# Patient Record
Sex: FEMALE | Race: WHITE | Employment: OTHER | URBAN - METROPOLITAN AREA
[De-identification: names, ages, dates, MRNs, and addresses within clinical notes are randomized per-mention and may not be internally consistent; named-entity substitution may affect disease eponyms.]

---

## 2017-07-07 ENCOUNTER — OFFICE VISIT (OUTPATIENT)
Dept: NEUROLOGY | Age: 62
End: 2017-07-07

## 2017-07-07 VITALS
DIASTOLIC BLOOD PRESSURE: 74 MMHG | RESPIRATION RATE: 16 BRPM | OXYGEN SATURATION: 98 % | WEIGHT: 138 LBS | HEART RATE: 65 BPM | SYSTOLIC BLOOD PRESSURE: 122 MMHG | BODY MASS INDEX: 20.92 KG/M2 | HEIGHT: 68 IN

## 2017-07-07 DIAGNOSIS — G43.109 MIGRAINE WITH AURA AND WITHOUT STATUS MIGRAINOSUS, NOT INTRACTABLE: Primary | ICD-10-CM

## 2017-07-07 RX ORDER — ZOLMITRIPTAN 5 MG/1
SPRAY NASAL
Qty: 12 CONTAINER | Refills: 11 | Status: SHIPPED | OUTPATIENT
Start: 2017-07-07 | End: 2018-04-03 | Stop reason: SDUPTHER

## 2017-07-07 RX ORDER — SERTRALINE HYDROCHLORIDE 100 MG/1
100 TABLET, FILM COATED ORAL DAILY
Qty: 90 TAB | Refills: 5 | Status: SHIPPED | OUTPATIENT
Start: 2017-07-07 | End: 2018-07-10 | Stop reason: SDUPTHER

## 2017-07-07 RX ORDER — TOPIRAMATE 100 MG/1
100 TABLET, FILM COATED ORAL 2 TIMES DAILY
Qty: 180 TAB | Refills: 5 | Status: SHIPPED | OUTPATIENT
Start: 2017-07-07 | End: 2018-07-10 | Stop reason: SDUPTHER

## 2017-07-07 NOTE — MR AVS SNAPSHOT
Visit Information Date & Time Provider Department Dept. Phone Encounter #  
 7/7/2017 10:40 AM Corina Crocker MD Neurology Clinic at [facility] [phone] [id number] Follow-up Instructions Return in about 6 months (around 1/7/2018). Your Appointments 7/6/2018 10:40 AM  
Follow Up with Corina Crocker MD  
Neurology Clinic at [facility] Appt Note: f/u migraines, jrb 7/7/17  
 [address] [place] [address], 
[address] [facility], [address] [address]  
[address] [facility], [address] [facility], [address] [address] Upcoming Health Maintenance Date Due Hepatitis C Screening 1955 DTaP/Tdap/Td series (1 - Tdap) 4/28/1974 PAP AKA CERVICAL CYTOLOGY 4/28/1974 BREAST CANCER SCRN MAMMOGRAM 4/28/2003 FOBT Q 1 YEAR AGE 50-75 4/28/2003 ZOSTER VACCINE AGE 60> 4/28/2013 INFLUENZA AGE 9 TO ADULT 8/1/2017 Allergies as of 7/7/2017  Review Complete On: 7/7/2017 By: Corina Crocker MD  
  
 Severity Noted Reaction Type Reactions Reglan [Metoclopramide] High 07/15/2013    Anaphylaxis Bactrim [Sulfamethoprim Ds]  07/15/2013    Rash Pcn [Penicillins]  07/15/2013    Other (comments) Current Immunizations  Never Reviewed No immunizations on file. Not reviewed this visit You Were Diagnosed With   
  
 Codes Comments Migraine with aura and without status migrainosus, not intractable    -  Primary ICD-10-CM: G43.109 ICD-9-CM: 346.00 Vitals BP Pulse Resp Height(growth percentile) Weight(growth percentile) SpO2  
 122/74 65 16 5' 8\" (1.727 m) 138 lb (62.6 kg) 98% BMI OB Status Smoking Status 20.98 kg/m2 Menopause Never Smoker Vitals History BMI and BSA Data Body Mass Index Body Surface Area  
 20.98 kg/m 2 1.73 m 2 Preferred Pharmacy Pharmacy Name Phone [facility]/PHARMACY #[id number]- Radha Chambers, [address] [phone] Your Updated Medication List  
  
   
This list is accurate as of: 7/7/17 11:30 AM.  Always use your most recent med list.  
  
  
  
  
 aspirin 81 mg tablet Take 81 mg by mouth. CENTRUM SILVER Tab tablet Generic drug:  multivitamins-minerals-lutein Take  by mouth. CO Q-10 100 mg capsule Generic drug:  co-enzyme Q-10 Take 300 mg by mouth daily. ibuprofen 200 mg tablet Commonly known as:  MOTRIN Take  by mouth.  
  
 magnesium oxide 400 mg tablet Commonly known as:  MAG-OX Take 400 mg by mouth daily. MULTIVITAMIN PO Take  by mouth. Women's vitafusion  
  
 promethazine 25 mg suppository Commonly known as:  PHENERGAN Insert 1 Suppository into rectum every six (6) hours as needed for Nausea. sertraline 100 mg tablet Commonly known as:  ZOLOFT Take 1 Tab by mouth daily. topiramate 100 mg tablet Commonly known as:  TOPAMAX Take 1 Tab by mouth two (2) times a day. triamcinolone 55 mcg nasal inhaler Commonly known as:  NASACORT AQ  
2 Sprays as needed. VITAMIN D3 1,000 unit Cap Generic drug:  cholecalciferol Take  by mouth. XANAX PO Take  by mouth as needed. ZOLMitriptan 5 mg nasal solution Commonly known as:  ZOMIG  
USE 1 SPRAY IN EACH NOSTRIL EVERY 12 HOURS AS NEEDED FOR HEADACHE. MUST LAST 30 DAYS Prescriptions Sent to Pharmacy Refills  
 topiramate (TOPAMAX) 100 mg tablet 5 Sig: Take 1 Tab by mouth two (2) times a day. Class: Normal  
 Pharmacy: [facility] #: [phone] Route: Oral  
 sertraline (ZOLOFT) 100 mg tablet 5 Sig: Take 1 Tab by mouth daily.   
 Class: Normal  
 Pharmacy: [facility]/pharmacy #[id number] - [facility] [address]  #: [phone] Route: Oral  
 ZOLMitriptan (ZOMIG) 5 mg nasal solution 11 Sig: USE 1 SPRAY IN EACH NOSTRIL EVERY 12 HOURS AS NEEDED FOR HEADACHE. MUST LAST 30 DAYS Class: Normal  
 Pharmacy: [facility] #: [phone] We Performed the Following TOPIRAMATE [21979 CPT(R)] Follow-up Instructions Return in about 6 months (around 1/7/2018). Patient Instructions A Healthy Lifestyle: Care Instructions Your Care Instructions A healthy lifestyle can help you feel good, stay at a healthy weight, and have plenty of energy for both work and play. A healthy lifestyle is something you can share with your whole family. A healthy lifestyle also can lower your risk for serious health problems, such as high blood pressure, heart disease, and diabetes. You can follow a few steps listed below to improve your health and the health of your family. Follow-up care is a key part of your treatment and safety. Be sure to make and go to all appointments, and call your doctor if you are having problems. Its also a good idea to know your test results and keep a list of the medicines you take. How can you care for yourself at home? · Do not eat too much sugar, fat, or fast foods. You can still have dessert and treats now and then. The goal is moderation. · Start small to improve your eating habits. Pay attention to portion sizes, drink less juice and soda pop, and eat more fruits and vegetables. ¨ Eat a healthy amount of food. A 3-ounce serving of meat, for example, is about the size of a deck of cards. Fill the rest of your plate with vegetables and whole grains. ¨ Limit the amount of soda and sports drinks you have every day. Drink more water when you are thirsty. ¨ Eat at least 5 servings of fruits and vegetables every day.  It may seem like a lot, but it is not hard to reach this goal. A serving or helping is 1 piece of fruit, 1 cup of vegetables, or 2 cups of leafy, raw vegetables. Have an apple or some carrot sticks as an afternoon snack instead of a candy bar. Try to have fruits and/or vegetables at every meal. 
· Make exercise part of your daily routine. You may want to start with simple activities, such as walking, bicycling, or slow swimming. Try to be active 30 to 60 minutes every day. You do not need to do all 30 to 60 minutes all at once. For example, you can exercise 3 times a day for 10 or 20 minutes. Moderate exercise is safe for most people, but it is always a good idea to talk to your doctor before starting an exercise program. 
· Keep moving. Trudy Adrianne the lawn, work in the garden, or [employer]. Take the stairs instead of the elevator at work. · If you smoke, quit. People who smoke have an increased risk for heart attack, stroke, cancer, and other lung illnesses. Quitting is hard, but there are ways to boost your chance of quitting tobacco for good. ¨ Use nicotine gum, patches, or lozenges. ¨ Ask your doctor about stop-smoking programs and medicines. ¨ Keep trying. In addition to reducing your risk of diseases in the future, you will notice some benefits soon after you stop using tobacco. If you have shortness of breath or asthma symptoms, they will likely get better within a few weeks after you quit. · Limit how much alcohol you drink. Moderate amounts of alcohol (up to 2 drinks a day for men, 1 drink a day for women) are okay. But drinking too much can lead to liver problems, high blood pressure, and other health problems. Family health If you have a family, there are many things you can do together to improve your health. · Eat meals together as a family as often as possible. · Eat healthy foods. This includes fruits, vegetables, lean meats and dairy, and whole grains. · Include your family in your fitness plan. Most people think of activities such as jogging or tennis as the way to fitness, but there are many ways you and your family can be more active. Anything that makes you breathe hard and gets your heart pumping is exercise. Here are some tips: 
¨ Walk to do errands or to take your child to school or the bus. ¨ Go for a family bike ride after dinner instead of watching TV. Where can you learn more? Go to http://manuela-yesica.info/. Enter A406 in the search box to learn more about \"A Healthy Lifestyle: Care Instructions. \" Current as of: July 26, 2016 Content Version: 11.3 © 3141-7651 [employer]. Care instructions adapted under license by [employer] (which disclaims liability or warranty for this information). If you have questions about a medical condition or this instruction, always ask your healthcare professional. [address] any warranty or liability for your use of this information. Introducing [facility]! Annie Mcelroy introduces Terarecon patient portal. Now you can access parts of your medical record, email your doctor's office, and request medication refills online. 1. In your internet browser, go to [email or web address] 2. Click on the First Time User? Click Here link in the Sign In box. You will see the New Member Sign Up page. 3. Enter your Terarecon Access Code exactly as it appears below. You will not need to use this code after youve completed the sign-up process. If you do not sign up before the expiration date, you must request a new code. · Terarecon Access Code: [email or web address] Expires: 7/17/2017 11:08 AM 
 
4. Enter the last four digits of your Social Security Number (xxxx) and Date of Birth (mm/dd/yyyy) as indicated and click Submit. You will be taken to the next sign-up page. 5. Create a SkyCache ID. This will be your SkyCache login ID and cannot be changed, so think of one that is secure and easy to remember. 6. Create a SkyCache password. You can change your password at any time. 7. Enter your Password Reset Question and Answer. This can be used at a later time if you forget your password. 8. Enter your e-mail address. You will receive e-mail notification when new information is available in [address] 9. Click Sign Up. You can now view and download portions of your medical record. 10. Click the Download Summary menu link to download a portable copy of your medical information. If you have questions, please visit the Frequently Asked Questions section of the SkyCache website. Remember, SkyCache is NOT to be used for urgent needs. For medical emergencies, dial 911. Now available from your iPhone and Android! Please provide this summary of care documentation to your next provider. Your primary care clinician is listed as Britney Dong. If you have any questions after today's visit, please call [phone].

## 2017-07-07 NOTE — LETTER
7/7/2017 10:43 PM 
 
Patient:  Cindi Cano YOB: 1955 Date of Visit: 7/7/2017 Dear No Recipients: Thank you for referring Ms. Willian Marion to me for evaluation/treatment. Below are the relevant portions of my assessment and plan of care. Consult Subjective:  
 
Cindi Cano is a 57 y. o.right-handed  female seen for evaluation of new problem over the last several months of increasing headaches, mainly in the frontal and sinus area associated with marked allergies and sinus congestion that occurred about 3-4 months ago, causing her headaches several times a week that were difficult to control even with antihistamines and medications for her sinus flareups at the request of Dr. Caleb Johnson. Her allergies have gotten somewhat better over the last few weeks, and the headaches are better and back to her normal baseline now. Her allergist retired and she does not know who to go to I suggested she see another physician in their group. She has tension vascular headaches and is currently on Topamax 100 mg twice a day and takes Zomig nasal spray as needed for headaches. We gave her refills for 12 nasal sprays but she only gets 6 a month, and I told her she should get 12 make sure the insurance company does give her that. She had no new focal weakness, sensory loss, fever, meningismus, or other causes of her headaches. Her  came with her and he is very concerned about her. She denies any increased stress tension or anxiety in addition. She is retired now and should be more relaxed. She does not get the aura anymore I told her that is exactly the opposite of what should happen, at her age she should be getting less headaches and more aura. Patient last seen one year ago.  Patient has a long-standing history of headaches, and has occasional exacerbation that requires Medrol Dosepak to break her headache cycle. She normally takes Zomig nasal spray good relief and takes Topamax 100 mg BID as prophylactic therapy, and aspirin 81 mg each day, magnesium oxide and coenzyme Q 10. She is also taking Zoloft 100 mg a day which helps control her headaches stress and tension. She's had normal imaging of the brain in the past, normal metabolic studies ruling out arteritis or other connective tissue disease. She has had no new focal weakness sensory loss visual changes cognitive issues gait problems or other new focal neurologic problems. Her headaches are more bitemporal, throbbing in nature associated with nausea and vomiting and occur once or twice a month, and occasionally more often. Stress and tension are precipitating factors. 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 A complete review of systems in symptoms was negative for any new medical problems complications and illnesses other than mild anxiety, depression, skin cancer, and respiratory infections. Past Medical History:  
Diagnosis Date  Headache History reviewed. No pertinent surgical history. Family History Problem Relation Age of Onset  Dementia Mother  Diabetes Mother  Heart Disease Father  Other Father   
  pad  Diabetes Father  Diabetes Brother Social History Substance Use Topics  Smoking status: Never Smoker  Smokeless tobacco: Never Used  Alcohol use No  
   
Current Outpatient Prescriptions Medication Sig Dispense Refill  MULTIVITAMIN PO Take  by mouth. Women's vitafusion  topiramate (TOPAMAX) 100 mg tablet Take 1 Tab by mouth two (2) times a day. 180 Tab 5  sertraline (ZOLOFT) 100 mg tablet Take 1 Tab by mouth daily. 90 Tab 5  ZOLMitriptan (ZOMIG) 5 mg nasal solution USE 1 SPRAY IN EACH NOSTRIL EVERY 12 HOURS AS NEEDED FOR HEADACHE.  MUST LAST 30 DAYS 12 Container 11  
  triamcinolone (NASACORT AQ) 55 mcg nasal inhaler 2 Sprays as needed.  magnesium oxide (MAG-OX) 400 mg tablet Take 400 mg by mouth daily.  co-enzyme Q-10 (CO Q-10) 100 mg capsule Take 300 mg by mouth daily.  promethazine (PHENERGAN) 25 mg suppository Insert 1 Suppository into rectum every six (6) hours as needed for Nausea. 12 Suppository 2  ibuprofen (MOTRIN) 200 mg tablet Take  by mouth.  aspirin 81 mg tablet Take 81 mg by mouth.  ALPRAZOLAM (XANAX PO) Take  by mouth as needed.  Cholecalciferol, Vitamin D3, (VITAMIN D3) 1,000 unit cap Take  by mouth.  multivitamins-minerals-lutein (CENTRUM SILVER) Tab Take  by mouth. Allergies Allergen Reactions  Reglan [Metoclopramide] Anaphylaxis  Bactrim [Sulfamethoprim Ds] Rash  Pcn [Penicillins] Other (comments) Review of Systems: A comprehensive review of systems was negative except for: Neurological: positive for headaches Objective: I 
 
 
NEUROLOGICAL EXAM: 
 
Appearance: The patient is thinly developed and nourished, provides a coherent history and is in no acute distress. Mental Status: Oriented to time, place and person and the president. Speech is fluent without aphasia, and cognitive function and fund of knowledge normal for age. Mood and affect mildly depressed. Cranial Nerves:   Intact visual fields. Fundi are benign. MAGI, EOM's full, no nystagmus, no ptosis. Facial sensation is normal. Corneal reflexes are not tested. Facial movement is symmetric. Hearing is normal bilaterally. Palate is midline with normal sternocleidomastoid and trapezius muscles are normal. Tongue is midline. Neck is supple without meningismus or bruits Temporal arteries not tender or enlarged Motor:  5/5 strength in upper and lower proximal and distal muscles. Normal bulk and tone. No fasciculations. Reflexes:   Deep tendon reflexes 2+/4 and symmetrical. No Babinski or clonus present Sensory:   Normal to touch, pinprick and vibration and temperature and DSS. Gait:  Normal gait. Tremor:   No tremor noted. Cerebellar:  No cerebellar signs present. Neurovascular:  Normal heart sounds and regular rhythm, peripheral pulses decreased in both feet and no carotid bruits. Assessment:  
 
 
 
Plan:  
 
Patient with recent breakthrough and marked increase in headaches, probably related to her marked allergies and sinus problems Patient seems back to normal now the last few weeks but had several months of severe headache associated with severe allergies Recommended she see her allergy group and see another physician since her allergist has retired. We will check a Topamax level to see if there is any room to increase her medication should she have another flareup Her medications are refilled for the patient today, her condition discussed with the patient and her  in detail Patient is to continue present medications. Patient was advised to remain mentally and physically active, exercising, take her vitamins and vitamin D and medications as prescribed. Refills given today for her medications Patient to be seen again in 6 months time or earlier if needed, and patient will call if any problems in the interim. Signed By: Barry Mendes MD   
 July 7, 2017 This note will not be viewable in [address] If you have questions, please do not hesitate to call me. I look forward to following Ms. Marion along with you. Sincerely, Barry Mendes MD

## 2017-07-07 NOTE — PATIENT INSTRUCTIONS

## 2017-07-08 NOTE — PROGRESS NOTES
Consult    Subjective:     Karthik Rascon is a 57 y. o.right-handed  female seen for evaluation of new problem over the last several months of increasing headaches, mainly in the frontal and sinus area associated with marked allergies and sinus congestion that occurred about 3-4 months ago, causing her headaches several times a week that were difficult to control even with antihistamines and medications for her sinus flareups at the request of Dr. Delmi Chen. Her allergies have gotten somewhat better over the last few weeks, and the headaches are better and back to her normal baseline now. Her allergist retired and she does not know who to go to I suggested she see another physician in their group. She has tension vascular headaches and is currently on Topamax 100 mg twice a day and takes Zomig nasal spray as needed for headaches. We gave her refills for 12 nasal sprays but she only gets 6 a month, and I told her she should get 12 make sure the insurance company does give her that. She had no new focal weakness, sensory loss, fever, meningismus, or other causes of her headaches. Her  came with her and he is very concerned about her. She denies any increased stress tension or anxiety in addition. She is retired now and should be more relaxed. She does not get the aura anymore I told her that is exactly the opposite of what should happen, at her age she should be getting less headaches and more aura. Patient last seen one year ago. Patient has a long-standing history of headaches, and has occasional exacerbation that requires Medrol Dosepak to break her headache cycle. She normally takes Zomig nasal spray good relief and takes Topamax 100 mg BID as prophylactic therapy, and aspirin 81 mg each day, magnesium oxide and coenzyme Q 10. She is also taking Zoloft 100 mg a day which helps control her headaches stress and tension.  She's had normal imaging of the brain in the past, normal metabolic studies ruling out arteritis or other connective tissue disease. She has had no new focal weakness sensory loss visual changes cognitive issues gait problems or other new focal neurologic problems. Her headaches are more bitemporal, throbbing in nature associated with nausea and vomiting and occur once or twice a month, and occasionally more often. Stress and tension are precipitating factors. 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 A complete review of systems in symptoms was negative for any new medical problems complications and illnesses other than mild anxiety, depression, skin cancer, and respiratory infections. Past Medical History:   Diagnosis Date    Headache       History reviewed. No pertinent surgical history. Family History   Problem Relation Age of Onset    Dementia Mother     Diabetes Mother     Heart Disease Father     Other Father      pad    Diabetes Father     Diabetes Brother       Social History   Substance Use Topics    Smoking status: Never Smoker    Smokeless tobacco: Never Used    Alcohol use No       Current Outpatient Prescriptions   Medication Sig Dispense Refill    MULTIVITAMIN PO Take  by mouth. Women's vitafusion      topiramate (TOPAMAX) 100 mg tablet Take 1 Tab by mouth two (2) times a day. 180 Tab 5    sertraline (ZOLOFT) 100 mg tablet Take 1 Tab by mouth daily. 90 Tab 5    ZOLMitriptan (ZOMIG) 5 mg nasal solution USE 1 SPRAY IN EACH NOSTRIL EVERY 12 HOURS AS NEEDED FOR HEADACHE. MUST LAST 30 DAYS 12 Container 11    triamcinolone (NASACORT AQ) 55 mcg nasal inhaler 2 Sprays as needed.  magnesium oxide (MAG-OX) 400 mg tablet Take 400 mg by mouth daily.  co-enzyme Q-10 (CO Q-10) 100 mg capsule Take 300 mg by mouth daily.  promethazine (PHENERGAN) 25 mg suppository Insert 1 Suppository into rectum every six (6) hours as needed for Nausea.  12 Suppository 2    ibuprofen (MOTRIN) 200 mg tablet Take by mouth.  aspirin 81 mg tablet Take 81 mg by mouth.  ALPRAZOLAM (XANAX PO) Take  by mouth as needed.  Cholecalciferol, Vitamin D3, (VITAMIN D3) 1,000 unit cap Take  by mouth.  multivitamins-minerals-lutein (CENTRUM SILVER) Tab Take  by mouth. Allergies   Allergen Reactions    Reglan [Metoclopramide] Anaphylaxis    Bactrim [Sulfamethoprim Ds] Rash    Pcn [Penicillins] Other (comments)        Review of Systems:  A comprehensive review of systems was negative except for: Neurological: positive for headaches     Objective:     I      NEUROLOGICAL EXAM:    Appearance: The patient is thinly developed and nourished, provides a coherent history and is in no acute distress. Mental Status: Oriented to time, place and person and the president. Speech is fluent without aphasia, and cognitive function and fund of knowledge normal for age. Mood and affect mildly depressed. Cranial Nerves:   Intact visual fields. Fundi are benign. MAGI, EOM's full, no nystagmus, no ptosis. Facial sensation is normal. Corneal reflexes are not tested. Facial movement is symmetric. Hearing is normal bilaterally. Palate is midline with normal sternocleidomastoid and trapezius muscles are normal. Tongue is midline. Neck is supple without meningismus or bruits  Temporal arteries not tender or enlarged    Motor:  5/5 strength in upper and lower proximal and distal muscles. Normal bulk and tone. No fasciculations. Reflexes:   Deep tendon reflexes 2+/4 and symmetrical. No Babinski or clonus present   Sensory:   Normal to touch, pinprick and vibration and temperature and DSS. Gait:  Normal gait. Tremor:   No tremor noted. Cerebellar:  No cerebellar signs present. Neurovascular:  Normal heart sounds and regular rhythm, peripheral pulses decreased in both feet and no carotid bruits.            Assessment:         Plan:     Patient with recent breakthrough and marked increase in headaches, probably related to her marked allergies and sinus problems  Patient seems back to normal now the last few weeks but had several months of severe headache associated with severe allergies  Recommended she see her allergy group and see another physician since her allergist has retired. We will check a Topamax level to see if there is any room to increase her medication should she have another flareup  Her medications are refilled for the patient today, her condition discussed with the patient and her  in detail  Patient is to continue present medications. Patient was advised to remain mentally and physically active, exercising, take her vitamins and vitamin D and medications as prescribed. Refills given today for her medications  Patient to be seen again in 6 months time or earlier if needed, and patient will call if any problems in the interim. Signed By: Vinny Varela MD     July 7, 2017       This note will not be viewable in [address]

## 2017-07-17 LAB — TOPIRAMATE SERPL-MCNC: 1.5 UG/ML (ref 2–25)

## 2017-07-25 DIAGNOSIS — G43.109 MIGRAINE WITH AURA AND WITHOUT STATUS MIGRAINOSUS, NOT INTRACTABLE: ICD-10-CM

## 2017-07-25 RX ORDER — ZOLMITRIPTAN 5 MG/1
SPRAY, METERED NASAL
Refills: 10 | OUTPATIENT
Start: 2017-07-25

## 2018-01-19 ENCOUNTER — OFFICE VISIT (OUTPATIENT)
Dept: NEUROLOGY | Age: 63
End: 2018-01-19

## 2018-01-19 VITALS
HEART RATE: 73 BPM | SYSTOLIC BLOOD PRESSURE: 120 MMHG | WEIGHT: 137 LBS | BODY MASS INDEX: 20.76 KG/M2 | OXYGEN SATURATION: 98 % | HEIGHT: 68 IN | DIASTOLIC BLOOD PRESSURE: 70 MMHG

## 2018-01-19 DIAGNOSIS — G43.109 MIGRAINE WITH AURA AND WITHOUT STATUS MIGRAINOSUS, NOT INTRACTABLE: Primary | ICD-10-CM

## 2018-01-19 NOTE — PROGRESS NOTES
Date:             2018    Name:  Ivan Martin  :  1955  MRN:  [id number]     PCP:  Addy Gonzalez MD    Chief Complaint   Patient presents with    Follow-up    Migraine         HISTORY OF PRESENT ILLNESS:  Xavier Reynolds is a 57 y.o., female who presents today for follow up for headaches. Headaches are about the same since she was last seen. Weather changes trigger her headaches, so she has had more headaches lately the temperature fluctuations. Her allergies are worse, used to see an allergist for shots. She is allergic to mold and dust mites. She has an appointment with a new allergist to be tested again to see if allergies are triggering her headaches. She just complete a medrol dose pack because she was going through her zomig very quickly with recent weather changes, but she is also very congested and thinks that this may be part of her headache process. She stopped taking her decongestants and her Zomig, took the steroids and her headache improved. She is on topamax 100 mg big, sertraline 100 mg daily, magnesium and Coq10 for preventative. This has greatly reduced her headache frequency and intensity, she used to go to the ER frequently for migraines. She no longer gets nausea with her migraines, and usually the Zomig works within 5-7 minutes. She usually has about two episodes of bad migraine cycles per year that require steroid dose pack. In December, she thinks that she had maybe 7-10 migraine days. She is very happy with her Zomig because it works quickly and does not upset her stomach. 2017 recap  Xavier Reynolds is a 57 y. o.right-handed  female seen for evaluation of new problem over the last several months of increasing headaches, mainly in the frontal and sinus area associated with marked allergies and sinus congestion that occurred about 3-4 months ago, causing her headaches several times a week that were difficult to control even with antihistamines and medications for her sinus flareups at the request of Dr. Tang Saab. Her allergies have gotten somewhat better over the last few weeks, and the headaches are better and back to her normal baseline now. Her allergist retired and she does not know who to go to I suggested she see another physician in their group. She has tension vascular headaches and is currently on Topamax 100 mg twice a day and takes Zomig nasal spray as needed for headaches. We gave her refills for 12 nasal sprays but she only gets 6 a month, and I told her she should get 12 make sure the insurance company does give her that. She had no new focal weakness, sensory loss, fever, meningismus, or other causes of her headaches. Her  came with her and he is very concerned about her. She denies any increased stress tension or anxiety in addition. She is retired now and should be more relaxed. She does not get the aura anymore I told her that is exactly the opposite of what should happen, at her age she should be getting less headaches and more aura. Patient last seen one year ago. Patient has a long-standing history of headaches, and has occasional exacerbation that requires Medrol Dosepak to break her headache cycle. She normally takes Zomig nasal spray good relief and takes Topamax 100 mg BID as prophylactic therapy, and aspirin 81 mg each day, magnesium oxide and coenzyme Q 10. She is also taking Zoloft 100 mg a day which helps control her headaches stress and tension. She's had normal imaging of the brain in the past, normal metabolic studies ruling out arteritis or other connective tissue disease. She has had no new focal weakness sensory loss visual changes cognitive issues gait problems or other new focal neurologic problems. Her headaches are more bitemporal, throbbing in nature associated with nausea and vomiting and occur once or twice a month, and occasionally more often. Stress and tension are precipitating factors.  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     A complete review of systems in symptoms was negative for any new medical problems complications and illnesses other than mild anxiety, depression, skin cancer, and respiratory infections. Current Outpatient Prescriptions   Medication Sig    MULTIVITAMIN PO Take  by mouth. Women's vitafusion    topiramate (TOPAMAX) 100 mg tablet Take 1 Tab by mouth two (2) times a day.  sertraline (ZOLOFT) 100 mg tablet Take 1 Tab by mouth daily.  ZOLMitriptan (ZOMIG) 5 mg nasal solution USE 1 SPRAY IN EACH NOSTRIL EVERY 12 HOURS AS NEEDED FOR HEADACHE. MUST LAST 30 DAYS    triamcinolone (NASACORT AQ) 55 mcg nasal inhaler 2 Sprays as needed.  magnesium oxide (MAG-OX) 400 mg tablet Take 400 mg by mouth daily.  co-enzyme Q-10 (CO Q-10) 100 mg capsule Take 300 mg by mouth daily.  promethazine (PHENERGAN) 25 mg suppository Insert 1 Suppository into rectum every six (6) hours as needed for Nausea.  ibuprofen (MOTRIN) 200 mg tablet Take  by mouth.  aspirin 81 mg tablet Take 81 mg by mouth.  ALPRAZOLAM (XANAX PO) Take  by mouth as needed.  Cholecalciferol, Vitamin D3, (VITAMIN D3) 1,000 unit cap Take  by mouth daily.  multivitamins-minerals-lutein (CENTRUM SILVER) Tab Take  by mouth. No current facility-administered medications for this visit. Allergies   Allergen Reactions    Reglan [Metoclopramide] Anaphylaxis    Bactrim [Sulfamethoprim Ds] Rash    Pcn [Penicillins] Other (comments)     Past Medical History:   Diagnosis Date    Headache(784.0)      History reviewed. No pertinent surgical history. Social History     Social History    Marital status:      Spouse name: N/A    Number of children: N/A    Years of education: N/A     Occupational History    Not on file.      Social History Main Topics    Smoking status: Never Smoker    Smokeless tobacco: Never Used    Alcohol use No    Drug use: No    Sexual activity: Not on file     Other Topics Concern    Not on file     Social History Narrative     Family History   Problem Relation Age of Onset    Dementia Mother     Diabetes Mother     Heart Disease Father     Other Father      pad    Diabetes Father     Diabetes Brother          PHYSICAL EXAMINATION:    Visit Vitals    /70    Pulse 73    Ht 5' 8\" (1.727 m)    Wt 137 lb (62.1 kg)    SpO2 98%    BMI 20.83 kg/m2     General:  Well defined, nourished, and groomed individual in no acute distress. Neck: Supple, nontender, no bruits, no pain with resistance to active range of motion. Heart: Regular rate and rhythm, no murmurs, rub, or gallop. Normal S1S2. Lungs:  Clear to auscultation bilaterally with equal chest expansion, no cough, no wheeze  Musculoskeletal:  Extremities revealed no edema and had full range of motion of joints. Psych:  Good mood and bright affect    NEUROLOGICAL EXAMINATION:     Mental Status:   Alert and oriented to person, place, and time with recent and remote memory intact. Attention span and concentration are normal. Speech is fluent with a full fund of knowledge. Cranial Nerves:    II, III, IV, VI:  Visual acuity grossly intact. Pupils are equal, round, and reactive to light. Extra-ocular movements are full and fluid. No ptosis or nystagmus. V-XII: Hearing is grossly intact. Facial features are symmetric, with normal sensation and strength. The palate rises symmetrically and the tongue protrudes midline. Sternocleidomastoids 5/5. Motor Examination: Normal tone, bulk, and strength, 5/5 muscle strength throughout. Coordination:  Finger to nose testing was normal.   No resting or intention tremor  Gait and Station:  Steady while walking and with tandem walking. Normal arm swing. No pronator drift. No muscle wasting or fasciculations noted. ASSESSMENT AND PLAN    ICD-10-CM ICD-9-CM    1.  Migraine with aura and without status migrainosus, not intractable G43.109 346.00      18-year-old comes in follow-up for migraines. She has recently good control with Topamax, sertraline, magnesium, CoQ10 for preventative. She still has about 7-10 migraines per month, but can get rid of them with Zomig. She does tend to have worsening of her migraines with changes in the weather, or  worsening of her allergies. She was seen an allergist next week. 1.  Continue Topamax 100 mg twice daily for migraine prevention, discussed side effects to look for and could try Trokendi if she develops any  2. Continue Zomig 5 mg for migraine abortive  3. Encouraged to try to limit use of analgesics including Zomig to no more than 2 days per week to prevent rebound    Follow-up in 6 months, call sooner if concerns    25+ minutes, >50% discussing above/answering questions/formulating plan      Fei Romano NP    This note was created using voice recognition software. Despite editing, there may be syntax errors.

## 2018-01-19 NOTE — PATIENT INSTRUCTIONS
[facility]   Statement to Patients  April 1, 2014      In an effort to ensure the large volume of patient prescription refills is processed in the most efficient and expeditious manner, we are asking our patients to assist us by calling your Pharmacy for all prescription refills, this will include also your  Mail Order Pharmacy. The pharmacy will contact our office electronically to continue the refill process. Please do not wait until the last minute to call your pharmacy. We need at least 48 hours (2days) to fill prescriptions. We also encourage you to call your pharmacy before going to  your prescription to make sure it is ready. With regard to controlled substance prescription refill requests (narcotic refills) that need to be picked up at our office, we ask your cooperation by providing us with at least 72 hours (3days) notice that you will need a refill. We will not refill narcotic prescription refill requests after 4:00pm on any weekday, Monday through Thursday, or after 2:00pm on Fridays, or on the weekends. We encourage everyone to explore another way of getting your prescription refill request processed using [employer], our patient web portal through our electronic medical record system. [employer] is an efficient and effective way to communicate your medication request directly to the office and  downloadable as an yann on your smart phone . [employer] also features a review functionality that allows you to view your medication list as well as leave messages for your physician. Are you ready to get connected? If so please review the attatched instructions or speak to any of our staff to get you set up right away! Thank you so much for your cooperation. Should you have any questions please contact our Practice Administrator.     The Physicians and Staff,  [facility]          A Healthy Lifestyle: Care Instructions  Your Care Instructions    A healthy lifestyle can help you feel good, stay at a healthy weight, and have plenty of energy for both work and play. A healthy lifestyle is something you can share with your whole family. A healthy lifestyle also can lower your risk for serious health problems, such as high blood pressure, heart disease, and diabetes. You can follow a few steps listed below to improve your health and the health of your family. Follow-up care is a key part of your treatment and safety. Be sure to make and go to all appointments, and call your doctor if you are having problems. It's also a good idea to know your test results and keep a list of the medicines you take. How can you care for yourself at home? · Do not eat too much sugar, fat, or fast foods. You can still have dessert and treats now and then. The goal is moderation. · Start small to improve your eating habits. Pay attention to portion sizes, drink less juice and soda pop, and eat more fruits and vegetables. ¨ Eat a healthy amount of food. A 3-ounce serving of meat, for example, is about the size of a deck of cards. Fill the rest of your plate with vegetables and whole grains. ¨ Limit the amount of soda and sports drinks you have every day. Drink more water when you are thirsty. ¨ Eat at least 5 servings of fruits and vegetables every day. It may seem like a lot, but it is not hard to reach this goal. A serving or helping is 1 piece of fruit, 1 cup of vegetables, or 2 cups of leafy, raw vegetables. Have an apple or some carrot sticks as an afternoon snack instead of a candy bar. Try to have fruits and/or vegetables at every meal.  · Make exercise part of your daily routine. You may want to start with simple activities, such as walking, bicycling, or slow swimming. Try to be active 30 to 60 minutes every day. You do not need to do all 30 to 60 minutes all at once. For example, you can exercise 3 times a day for 10 or 20 minutes.  Moderate exercise is safe for most people, but it is always a good idea to talk to your doctor before starting an exercise program.  · Keep moving. Huong Chambersint the lawn, work in the garden, or [employer]. Take the stairs instead of the elevator at work. · If you smoke, quit. People who smoke have an increased risk for heart attack, stroke, cancer, and other lung illnesses. Quitting is hard, but there are ways to boost your chance of quitting tobacco for good. ¨ Use nicotine gum, patches, or lozenges. ¨ Ask your doctor about stop-smoking programs and medicines. ¨ Keep trying. In addition to reducing your risk of diseases in the future, you will notice some benefits soon after you stop using tobacco. If you have shortness of breath or asthma symptoms, they will likely get better within a few weeks after you quit. · Limit how much alcohol you drink. Moderate amounts of alcohol (up to 2 drinks a day for men, 1 drink a day for women) are okay. But drinking too much can lead to liver problems, high blood pressure, and other health problems. Family health  If you have a family, there are many things you can do together to improve your health. · Eat meals together as a family as often as possible. · Eat healthy foods. This includes fruits, vegetables, lean meats and dairy, and whole grains. · Include your family in your fitness plan. Most people think of activities such as jogging or tennis as the way to fitness, but there are many ways you and your family can be more active. Anything that makes you breathe hard and gets your heart pumping is exercise. Here are some tips:  ¨ Walk to do errands or to take your child to school or the bus. ¨ Go for a family bike ride after dinner instead of watching TV. Where can you learn more? Go to http://manuela-yesica.info/. Enter O323 in the search box to learn more about \"A Healthy Lifestyle: Care Instructions. \"  Current as of:  May 12, 2017  Content Version: 11.4  © 0710-3457 [employer]. Care instructions adapted under license by [employer] (which disclaims liability or warranty for this information). If you have questions about a medical condition or this instruction, always ask your healthcare professional. [place] [address] any warranty or liability for your use of this information.

## 2018-01-19 NOTE — MR AVS SNAPSHOT
[address] [address], 
[facility], [address] [facility]. 
[phone] Patient: Sandrita Ferreira MRN: [id number] TCT:[id number]/77/[id number] Visit Information Date & Time Provider Department Dept. Phone Encounter #  
 1/19/2018 10:30 AM Eliza Anders NP [facility]rology [facility] at [facility] [phone] [id number] Upcoming Health Maintenance Date Due Hepatitis C Screening 1955 DTaP/Tdap/Td series (1 - Tdap) 4/28/1974 PAP AKA CERVICAL CYTOLOGY 4/28/1974 BREAST CANCER SCRN MAMMOGRAM 4/28/2003 FOBT Q 1 YEAR AGE 50-75 4/28/2003 ZOSTER VACCINE AGE 60> 2/28/2013 Influenza Age 5 to Adult 8/1/2017 Allergies as of 1/19/2018  Review Complete On: 1/19/2018 By: Lora Vega LPN Severity Noted Reaction Type Reactions Reglan [Metoclopramide] High 07/15/2013    Anaphylaxis Bactrim [Sulfamethoprim Ds]  07/15/2013    Rash Pcn [Penicillins]  07/15/2013    Other (comments) Current Immunizations  Never Reviewed No immunizations on file. Not reviewed this visit You Were Diagnosed With   
  
 Codes Comments Migraine with aura and without status migrainosus, not intractable    -  Primary ICD-10-CM: G43.109 ICD-9-CM: 346.00 Vitals BP Pulse Height(growth percentile) Weight(growth percentile) SpO2 BMI  
 120/70 73 5' 8\" (1.727 m) 137 lb (62.1 kg) 98% 20.83 kg/m2 OB Status Smoking Status Menopause Never Smoker Vitals History BMI and BSA Data Body Mass Index Body Surface Area  
 20.83 kg/m 2 1.73 m 2 Preferred Pharmacy Pharmacy Name Phone [id number], [address] [place] [address] [phone] Your Updated Medication List  
  
   
This list is accurate as of: 1/19/18 11:05 AM.  Always use your most recent med list.  
  
  
  
  
 aspirin 81 mg tablet Take 81 mg by mouth. CENTRUM SILVER Tab tablet Generic drug:  multivitamins-minerals-lutein Take  by mouth. CO Q-10 100 mg capsule Generic drug:  co-enzyme Q-10 Take 300 mg by mouth daily. ibuprofen 200 mg tablet Commonly known as:  MOTRIN Take  by mouth.  
  
 magnesium oxide 400 mg tablet Commonly known as:  MAG-OX Take 400 mg by mouth daily. MULTIVITAMIN PO Take  by mouth. Women's vitafusion  
  
 promethazine 25 mg suppository Commonly known as:  PHENERGAN Insert 1 Suppository into rectum every six (6) hours as needed for Nausea. sertraline 100 mg tablet Commonly known as:  ZOLOFT Take 1 Tab by mouth daily. topiramate 100 mg tablet Commonly known as:  TOPAMAX Take 1 Tab by mouth two (2) times a day. triamcinolone 55 mcg nasal inhaler Commonly known as:  NASACORT AQ  
2 Sprays as needed. VITAMIN D3 1,000 unit Cap Generic drug:  cholecalciferol Take  by mouth daily. XANAX PO Take  by mouth as needed. ZOLMitriptan 5 mg nasal solution Commonly known as:  ZOMIG  
USE 1 SPRAY IN EACH NOSTRIL EVERY 12 HOURS AS NEEDED FOR HEADACHE. MUST LAST 30 DAYS Patient Instructions PRESCRIPTION REFILL POLICY [facility] Statement to Patients April 1, 2014 In an effort to ensure the large volume of patient prescription refills is processed in the most efficient and expeditious manner, we are asking our patients to assist us by calling your Pharmacy for all prescription refills, this will include also your  Mail Order Pharmacy. The pharmacy will contact our office electronically to continue the refill process. Please do not wait until the last minute to call your pharmacy. We need at least 48 hours (2days) to fill prescriptions. We also encourage you to call your pharmacy before going to  your prescription to make sure it is ready. With regard to controlled substance prescription refill requests (narcotic refills) that need to be picked up at our office, we ask your cooperation by providing us with at least 72 hours (3days) notice that you will need a refill. We will not refill narcotic prescription refill requests after 4:00pm on any weekday, Monday through Thursday, or after 2:00pm on Fridays, or on the weekends. We encourage everyone to explore another way of getting your prescription refill request processed using [employer], our patient web portal through our electronic medical record system. [employer] is an efficient and effective way to communicate your medication request directly to the office and  downloadable as an yann on your smart phone . [employer] also features a review functionality that allows you to view your medication list as well as leave messages for your physician. Are you ready to get connected? If so please review the attatched instructions or speak to any of our staff to get you set up right away! Thank you so much for your cooperation. Should you have any questions please contact our Practice Administrator. The Physicians and Staff,  [facility] A Healthy Lifestyle: Care Instructions Your Care Instructions A healthy lifestyle can help you feel good, stay at a healthy weight, and have plenty of energy for both work and play. A healthy lifestyle is something you can share with your whole family. A healthy lifestyle also can lower your risk for serious health problems, such as high blood pressure, heart disease, and diabetes. You can follow a few steps listed below to improve your health and the health of your family. Follow-up care is a key part of your treatment and safety. Be sure to make and go to all appointments, and call your doctor if you are having problems. It's also a good idea to know your test results and keep a list of the medicines you take. How can you care for yourself at home? · Do not eat too much sugar, fat, or fast foods. You can still have dessert and treats now and then. The goal is moderation. · Start small to improve your eating habits. Pay attention to portion sizes, drink less juice and soda pop, and eat more fruits and vegetables. ¨ Eat a healthy amount of food. A 3-ounce serving of meat, for example, is about the size of a deck of cards. Fill the rest of your plate with vegetables and whole grains. ¨ Limit the amount of soda and sports drinks you have every day. Drink more water when you are thirsty. ¨ Eat at least 5 servings of fruits and vegetables every day. It may seem like a lot, but it is not hard to reach this goal. A serving or helping is 1 piece of fruit, 1 cup of vegetables, or 2 cups of leafy, raw vegetables. Have an apple or some carrot sticks as an afternoon snack instead of a candy bar. Try to have fruits and/or vegetables at every meal. 
· Make exercise part of your daily routine. You may want to start with simple activities, such as walking, bicycling, or slow swimming. Try to be active 30 to 60 minutes every day. You do not need to do all 30 to 60 minutes all at once. For example, you can exercise 3 times a day for 10 or 20 minutes. Moderate exercise is safe for most people, but it is always a good idea to talk to your doctor before starting an exercise program. 
· Keep moving. Cynthia Kelly the lawn, work in the garden, or [place]. Take the stairs instead of the elevator at work. · If you smoke, quit. People who smoke have an increased risk for heart attack, stroke, cancer, and other lung illnesses. Quitting is hard, but there are ways to boost your chance of quitting tobacco for good. ¨ Use nicotine gum, patches, or lozenges. ¨ Ask your doctor about stop-smoking programs and medicines. ¨ Keep trying.  
In addition to reducing your risk of diseases in the future, you will notice some benefits soon after you stop using tobacco. If you have shortness of breath or asthma symptoms, they will likely get better within a few weeks after you quit. · Limit how much alcohol you drink. Moderate amounts of alcohol (up to 2 drinks a day for men, 1 drink a day for women) are okay. But drinking too much can lead to liver problems, high blood pressure, and other health problems. Family health If you have a family, there are many things you can do together to improve your health. · Eat meals together as a family as often as possible. · Eat healthy foods. This includes fruits, vegetables, lean meats and dairy, and whole grains. · Include your family in your fitness plan. Most people think of activities such as jogging or tennis as the way to fitness, but there are many ways you and your family can be more active. Anything that makes you breathe hard and gets your heart pumping is exercise. Here are some tips: 
¨ Walk to do errands or to take your child to school or the bus. ¨ Go for a family bike ride after dinner instead of watching TV. Where can you learn more? Go to http://[employer].info/. Enter X154 in the search box to learn more about \"A Healthy Lifestyle: Care Instructions. \" Current as of: May 12, 2017 Content Version: 11.4 © 5691-8451 [employer] Care instructions adapted under license by [employer] (which disclaims liability or warranty for this information). If you have questions about a medical condition or this instruction, always ask your healthcare professional. [address] [place] [address] any warranty or liability for your use of this information. Introducing [facility] [place] [facility]! [employer] introduces [employer] patient portal. Now you can access parts of your medical record, email your doctor's office, and request medication refills online.    
 
1. In your internet browser, go to [email or web address] 2. Click on the First Time User? Click Here link in the Sign In box. You will see the New Member Sign Up page. 3. Enter your Shared Performance Access Code exactly as it appears below. You will not need to use this code after youve completed the sign-up process. If you do not sign up before the expiration date, you must request a new code. · Shared Performance Access Code: [email or web address] Expires: 4/19/2018 11:05 AM 
 
4. Enter the last four digits of your Social Security Number (xxxx) and Date of Birth (mm/dd/yyyy) as indicated and click Submit. You will be taken to the next sign-up page. 5. Create a [employer] Renewablest ID. This will be your Shared Performance login ID and cannot be changed, so think of one that is secure and easy to remember. 6. Create a Shared Performance password. You can change your password at any time. 7. Enter your Password Reset Question and Answer. This can be used at a later time if you forget your password. 8. Enter your e-mail address. You will receive e-mail notification when new information is available in [address] 9. Click Sign Up. You can now view and download portions of your medical record. 10. Click the Download Summary menu link to download a portable copy of your medical information. If you have questions, please visit the Frequently Asked Questions section of the Shared Performance website. Remember, Shared Performance is NOT to be used for urgent needs. For medical emergencies, dial 911. Now available from your iPhone and Android! Please provide this summary of care documentation to your next provider. Your primary care clinician is listed as Swapnil Ramos. If you have any questions after today's visit, please call [phone].

## 2018-04-03 DIAGNOSIS — G43.109 MIGRAINE WITH AURA AND WITHOUT STATUS MIGRAINOSUS, NOT INTRACTABLE: ICD-10-CM

## 2018-04-03 RX ORDER — ZOLMITRIPTAN 5 MG/1
SPRAY NASAL
Qty: 3 CONTAINER | Refills: 0 | Status: SHIPPED | COMMUNITY
Start: 2018-04-03 | End: 2018-05-16 | Stop reason: ALTCHOICE

## 2018-04-03 NOTE — TELEPHONE ENCOUNTER
Notified that these are ready for   Please sign off on samples while we are working on prior authorization

## 2018-04-11 ENCOUNTER — TELEPHONE (OUTPATIENT)
Dept: NEUROLOGY | Age: 63
End: 2018-04-11

## 2018-04-11 NOTE — TELEPHONE ENCOUNTER
RE: Zomig [place]    Sent PA via CMM to [facility] with office notes. Padmaja Bartholomew ([place]: [id number])     [facility] is reviewing your PA request. You may close this dialog, return to your dashboard, and perform other tasks. To check for an update later, refresh this page, or open this request again from your dashboard.

## 2018-05-16 DIAGNOSIS — G43.109 MIGRAINE WITH AURA AND WITHOUT STATUS MIGRAINOSUS, NOT INTRACTABLE: Primary | ICD-10-CM

## 2018-05-16 RX ORDER — SUMATRIPTAN 20 MG/1
1 SPRAY NASAL
Qty: 12 CONTAINER | Refills: 11 | Status: SHIPPED | OUTPATIENT
Start: 2018-05-16 | End: 2018-05-17 | Stop reason: CLARIF

## 2018-05-17 DIAGNOSIS — G43.109 MIGRAINE WITH AURA AND WITHOUT STATUS MIGRAINOSUS, NOT INTRACTABLE: Primary | ICD-10-CM

## 2018-05-17 RX ORDER — RIZATRIPTAN BENZOATE 10 MG/1
10 TABLET, ORALLY DISINTEGRATING ORAL
Qty: 12 TAB | Refills: 11 | Status: SHIPPED | OUTPATIENT
Start: 2018-05-17 | End: 2019-03-25 | Stop reason: SDUPTHER

## 2018-07-10 DIAGNOSIS — G43.109 MIGRAINE WITH AURA AND WITHOUT STATUS MIGRAINOSUS, NOT INTRACTABLE: ICD-10-CM

## 2018-07-10 RX ORDER — SERTRALINE HYDROCHLORIDE 100 MG/1
TABLET, FILM COATED ORAL
Qty: 90 TAB | Refills: 4 | Status: SHIPPED | OUTPATIENT
Start: 2018-07-10 | End: 2019-03-26 | Stop reason: SDUPTHER

## 2018-07-10 RX ORDER — TOPIRAMATE 100 MG/1
TABLET, FILM COATED ORAL
Qty: 180 TAB | Refills: 4 | Status: SHIPPED | OUTPATIENT
Start: 2018-07-10 | End: 2019-03-25 | Stop reason: SDUPTHER

## 2018-07-31 ENCOUNTER — OFFICE VISIT (OUTPATIENT)
Dept: NEUROLOGY | Age: 63
End: 2018-07-31

## 2018-07-31 VITALS
OXYGEN SATURATION: 97 % | SYSTOLIC BLOOD PRESSURE: 118 MMHG | BODY MASS INDEX: 21.67 KG/M2 | DIASTOLIC BLOOD PRESSURE: 76 MMHG | HEIGHT: 68 IN | WEIGHT: 143 LBS | HEART RATE: 76 BPM

## 2018-07-31 DIAGNOSIS — G43.109 MIGRAINE WITH AURA AND WITHOUT STATUS MIGRAINOSUS, NOT INTRACTABLE: Primary | ICD-10-CM

## 2018-07-31 NOTE — PROGRESS NOTES
Consult    Subjective:     Brock Martinez is a 70 y. o.right-handed  female seen for evaluation at the request of Dr. Kosta Dorsey of new problem of macrocytosis and the possibility of this may be related to her medications and seizure medications and what other causes may be causing her macrocytosis and we need to stop any of her medication. We checked the different medications that can cause it, and her medications are not listed, none of the triptan's, not Topamax, and not Zoloft. Her B12 level has been checked by her PCP and apparently is normal.  She is already taking a B complex multivitamin. She may be hypothyroid. This is all being checked by her medical doctors and she is going to a hematologist.  Her headaches are doing fair, and she had a switch to the Maxalt because of insurance issues, and that is working on the headaches but not as good as the Zomig did, but she is managing. We told her to check good Rx to see if they could help her with her prescription coverage. Her headaches usually occur over mainly in the frontal and sinus area associated with marked allergies and sinus congestion that occur several times a month. Her allergies have gotten somewhat better over the last few weeks, and the headaches are better and back to her normal baseline now. Her allergist retired and she does not know who to go to I suggested she see another physician in their group. She has tension vascular headaches and is currently on Topamax 100 mg twice a day and takes Zomig nasal spray as needed for headaches. Her last Topamax level were only 1.5 which is on the low side. We gave her refills for 12 nasal sprays but she only gets 6 a month, and I told her she should get 12 make sure the insurance company does give her that. She had no new focal weakness, sensory loss, fever, meningismus, or other causes of her headaches. Her  came with her and he is very concerned about her.   She denies any increased stress tension or anxiety in addition. She is retired now and should be more relaxed. She does not get the aura anymore I told her that is exactly the opposite of what should happen, at her age she should be getting less headaches and more aura. Patient last seen one year ago. Patient has a long-standing history of headaches, and has occasional exacerbation that requires Medrol Dosepak to break her headache cycle. She normally takes Zomig nasal spray good relief and takes Topamax 100 mg BID as prophylactic therapy, and aspirin 81 mg each day, magnesium oxide and coenzyme Q 10. She is also taking Zoloft 100 mg a day which helps control her headaches stress and tension. She's had normal imaging of the brain in the past, normal metabolic studies ruling out arteritis or other connective tissue disease. She has had no new focal weakness sensory loss visual changes cognitive issues gait problems or other new focal neurologic problems. Her headaches are more bitemporal, throbbing in nature associated with nausea and vomiting and occur once or twice a month, and occasionally more often. Stress and tension are precipitating factors. 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 A complete review of systems in symptoms was negative for any new medical problems complications and illnesses other than mild anxiety, depression, skin cancer, and respiratory infections. Past Medical History:   Diagnosis Date    Headache(784.0)       History reviewed. No pertinent surgical history.   Family History   Problem Relation Age of Onset    Dementia Mother     Diabetes Mother     Heart Disease Father     Other Father      pad    Diabetes Father     Diabetes Brother       Social History   Substance Use Topics    Smoking status: Never Smoker    Smokeless tobacco: Never Used    Alcohol use No       Current Outpatient Prescriptions   Medication Sig Dispense Refill    sertraline (ZOLOFT) 100 mg tablet TAKE 1 TAB BY MOUTH DAILY. 90 Tab 4    topiramate (TOPAMAX) 100 mg tablet TAKE 1 TAB BY MOUTH TWO (2) TIMES A DAY. 180 Tab 4    rizatriptan (MAXALT-MLT) 10 mg disintegrating tablet Take 1 Tab by mouth every twelve (12) hours as needed for Migraine. 12 Tab 11    MULTIVITAMIN PO Take  by mouth. Women's vitafusion      triamcinolone (NASACORT AQ) 55 mcg nasal inhaler 2 Sprays as needed.  magnesium oxide (MAG-OX) 400 mg tablet Take 400 mg by mouth daily.  co-enzyme Q-10 (CO Q-10) 100 mg capsule Take 300 mg by mouth daily.  promethazine (PHENERGAN) 25 mg suppository Insert 1 Suppository into rectum every six (6) hours as needed for Nausea. 12 Suppository 2    ibuprofen (MOTRIN) 200 mg tablet Take  by mouth.  aspirin 81 mg tablet Take 81 mg by mouth.  Cholecalciferol, Vitamin D3, (VITAMIN D3) 1,000 unit cap Take  by mouth daily.  multivitamins-minerals-lutein (CENTRUM SILVER) Tab Take  by mouth. Allergies   Allergen Reactions    Reglan [Metoclopramide] Anaphylaxis    Bactrim [Sulfamethoprim Ds] Rash    Pcn [Penicillins] Other (comments)        Review of Systems:  A comprehensive review of systems was negative except for: Neurological: positive for headaches     Objective:     I      NEUROLOGICAL EXAM:    Appearance: The patient is thinly developed and nourished, provides a coherent history and is in no acute distress. Mental Status: Oriented to time, place and person and the president. Speech is fluent without aphasia, and cognitive function and fund of knowledge normal for age. Mood and affect mildly depressed. Cranial Nerves:   Intact visual fields. Fundi are benign. MAGI, EOM's full, no nystagmus, no ptosis. Facial sensation is normal. Corneal reflexes are not tested. Facial movement is symmetric. Hearing is normal bilaterally.  Palate is midline with normal sternocleidomastoid and trapezius muscles are normal. Tongue is midline. Neck is supple without meningismus or bruits  Temporal arteries not tender or enlarged    Motor:  5/5 strength in upper and lower proximal and distal muscles. Normal bulk and tone. No fasciculations. Reflexes:   Deep tendon reflexes 2+/4 and symmetrical. No Babinski or clonus present   Sensory:   Normal to touch, pinprick and vibration and temperature and DSS. Gait:  Normal gait. Tremor:   No tremor noted. Cerebellar:  No cerebellar signs present. Neurovascular:  Normal heart sounds and regular rhythm, peripheral pulses decreased in both feet and no carotid bruits. Assessment:         Plan: With recent problem of increasing macrocytosis, for which she is going to see a hematologist and some concern about her medications causing this problem. Seizure medications were listed as a cause, but on my list valproic acid is the main cause, and Topamax is not even listed. We researched the problem and the medications in detail with the patient, and printed off articles for her on the different medications that can cause a problem, and reassured her that none of her medications are on the list.  35 minutes spent with the patient and her , 20 minutes was spent on counseling her on macrocytosis and the fact that her medications do not cause that problem, and giving her articles of research on it. I reassured her needed with the triptan's or Zoloft so she should be safe to continue her current medications which are providing adequate relief of her headaches. Other new problems insurance not covering her Zomig nasal spray, we told her to go to the good Rx website to check on coverage, and to continue the Maxalt in the interim which seem to be providing adequate relief. Recommended she see her allergy group and see another physician since her allergist has retired. We will check a Topamax level if needed but we just did well so we will do it yet.   Her medications are refilled for the patient today, her condition discussed with the patient and her  in detail  Patient is to continue present medications. Patient was advised to remain mentally and physically active, exercising, take her vitamins and vitamin D and medications as prescribed. Refills given today for her medications  Patient to be seen again in 12 months time or earlier if needed, and patient will call if any problems in the interim. Signed By: Claudai Knight MD     July 31, 2018       This note will not be viewable in [address]

## 2018-07-31 NOTE — LETTER
7/31/2018 1:56 PM 
 
Patient:  Zaina Lewis YOB: 1955 Date of Visit: 7/31/2018 Dear No Recipients: Thank you for referring Ms. Dana Marion to me for evaluation/treatment. Below are the relevant portions of my assessment and plan of care. Consult Subjective:  
 
Zaina Lewis is a 70 y. o.right-handed  female seen for evaluation at the request of Dr. Ramiro White of new problem of macrocytosis and the possibility of this may be related to her medications and seizure medications and what other causes may be causing her macrocytosis and we need to stop any of her medication. We checked the different medications that can cause it, and her medications are not listed, none of the triptan's, not Topamax, and not Zoloft. Her B12 level has been checked by her PCP and apparently is normal.  She is already taking a B complex multivitamin. She may be hypothyroid. This is all being checked by her medical doctors and she is going to a hematologist.  Her headaches are doing fair, and she had a switch to the Maxalt because of insurance issues, and that is working on the headaches but not as good as the Zomig did, but she is managing. We told her to check good Rx to see if they could help her with her prescription coverage. Her headaches usually occur over mainly in the frontal and sinus area associated with marked allergies and sinus congestion that occur several times a month. Her allergies have gotten somewhat better over the last few weeks, and the headaches are better and back to her normal baseline now. Her allergist retired and she does not know who to go to I suggested she see another physician in their group. She has tension vascular headaches and is currently on Topamax 100 mg twice a day and takes Zomig nasal spray as needed for headaches. Her last Topamax level were only 1.5 which is on the low side.   We gave her refills for 12 nasal sprays but she only gets 6 a month, and I told her she should get 12 make sure the insurance company does give her that. She had no new focal weakness, sensory loss, fever, meningismus, or other causes of her headaches. Her  came with her and he is very concerned about her. She denies any increased stress tension or anxiety in addition. She is retired now and should be more relaxed. She does not get the aura anymore I told her that is exactly the opposite of what should happen, at her age she should be getting less headaches and more aura. Patient last seen one year ago. Patient has a long-standing history of headaches, and has occasional exacerbation that requires Medrol Dosepak to break her headache cycle. She normally takes Zomig nasal spray good relief and takes Topamax 100 mg BID as prophylactic therapy, and aspirin 81 mg each day, magnesium oxide and coenzyme Q 10. She is also taking Zoloft 100 mg a day which helps control her headaches stress and tension. She's had normal imaging of the brain in the past, normal metabolic studies ruling out arteritis or other connective tissue disease. She has had no new focal weakness sensory loss visual changes cognitive issues gait problems or other new focal neurologic problems. Her headaches are more bitemporal, throbbing in nature associated with nausea and vomiting and occur once or twice a month, and occasionally more often. Stress and tension are precipitating factors. 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 A complete review of systems in symptoms was negative for any new medical problems complications and illnesses other than mild anxiety, depression, skin cancer, and respiratory infections. Past Medical History:  
Diagnosis Date  Headache(784.0) History reviewed. No pertinent surgical history. Family History Problem Relation Age of Onset  Dementia Mother  Diabetes Mother  Heart Disease Father  Other Father   
  pad  Diabetes Father  Diabetes Brother Social History Substance Use Topics  Smoking status: Never Smoker  Smokeless tobacco: Never Used  Alcohol use No  
   
Current Outpatient Prescriptions Medication Sig Dispense Refill  sertraline (ZOLOFT) 100 mg tablet TAKE 1 TAB BY MOUTH DAILY. 90 Tab 4  
 topiramate (TOPAMAX) 100 mg tablet TAKE 1 TAB BY MOUTH TWO (2) TIMES A DAY. 180 Tab 4  
 rizatriptan (MAXALT-MLT) 10 mg disintegrating tablet Take 1 Tab by mouth every twelve (12) hours as needed for Migraine. 12 Tab 11  
 MULTIVITAMIN PO Take  by mouth. Women's vitafusion  triamcinolone (NASACORT AQ) 55 mcg nasal inhaler 2 Sprays as needed.  magnesium oxide (MAG-OX) 400 mg tablet Take 400 mg by mouth daily.  co-enzyme Q-10 (CO Q-10) 100 mg capsule Take 300 mg by mouth daily.  promethazine (PHENERGAN) 25 mg suppository Insert 1 Suppository into rectum every six (6) hours as needed for Nausea. 12 Suppository 2  ibuprofen (MOTRIN) 200 mg tablet Take  by mouth.  aspirin 81 mg tablet Take 81 mg by mouth.  Cholecalciferol, Vitamin D3, (VITAMIN D3) 1,000 unit cap Take  by mouth daily.  multivitamins-minerals-lutein (CENTRUM SILVER) Tab Take  by mouth. Allergies Allergen Reactions  Reglan [Metoclopramide] Anaphylaxis  Bactrim [Sulfamethoprim Ds] Rash  Pcn [Penicillins] Other (comments) Review of Systems: A comprehensive review of systems was negative except for: Neurological: positive for headaches Objective: I 
 
 
NEUROLOGICAL EXAM: 
 
Appearance: The patient is thinly developed and nourished, provides a coherent history and is in no acute distress. Mental Status: Oriented to time, place and person and the president. Speech is fluent without aphasia, and cognitive function and fund of knowledge normal for age. Mood and affect mildly depressed. Cranial Nerves:   Intact visual fields. Fundi are benign. MAGI, EOM's full, no nystagmus, no ptosis. Facial sensation is normal. Corneal reflexes are not tested. Facial movement is symmetric. Hearing is normal bilaterally. Palate is midline with normal sternocleidomastoid and trapezius muscles are normal. Tongue is midline. Neck is supple without meningismus or bruits Temporal arteries not tender or enlarged Motor:  5/5 strength in upper and lower proximal and distal muscles. Normal bulk and tone. No fasciculations. Reflexes:   Deep tendon reflexes 2+/4 and symmetrical. No Babinski or clonus present Sensory:   Normal to touch, pinprick and vibration and temperature and DSS. Gait:  Normal gait. Tremor:   No tremor noted. Cerebellar:  No cerebellar signs present. Neurovascular:  Normal heart sounds and regular rhythm, peripheral pulses decreased in both feet and no carotid bruits. Assessment:  
 
 
 
Plan: With recent problem of increasing macrocytosis, for which she is going to see a hematologist and some concern about her medications causing this problem. Seizure medications were listed as a cause, but on my list valproic acid is the main cause, and Topamax is not even listed. We researched the problem and the medications in detail with the patient, and printed off articles for her on the different medications that can cause a problem, and reassured her that none of her medications are on the list. 
35 minutes spent with the patient and her , 20 minutes was spent on counseling her on macrocytosis and the fact that her medications do not cause that problem, and giving her articles of research on it. I reassured her needed with the triptan's or Zoloft so she should be safe to continue her current medications which are providing adequate relief of her headaches.  
Other new problems insurance not covering her Zomig nasal spray, we told her to go to the good Rx website to check on coverage, and to continue the Maxalt in the interim which seem to be providing adequate relief. Recommended she see her allergy group and see another physician since her allergist has retired. We will check a Topamax level if needed but we just did well so we will do it yet. Her medications are refilled for the patient today, her condition discussed with the patient and her  in detail Patient is to continue present medications. Patient was advised to remain mentally and physically active, exercising, take her vitamins and vitamin D and medications as prescribed. Refills given today for her medications Patient to be seen again in 12 months time or earlier if needed, and patient will call if any problems in the interim. Signed By: Warren Boast, MD   
 July 31, 2018 This note will not be viewable in [address] If you have questions, please do not hesitate to call me. I look forward to following Ms. Marion along with you. Sincerely, Warren Boast, MD

## 2019-03-25 ENCOUNTER — OFFICE VISIT (OUTPATIENT)
Dept: NEUROLOGY | Age: 64
End: 2019-03-25

## 2019-03-25 VITALS
WEIGHT: 141 LBS | DIASTOLIC BLOOD PRESSURE: 70 MMHG | OXYGEN SATURATION: 98 % | BODY MASS INDEX: 21.37 KG/M2 | SYSTOLIC BLOOD PRESSURE: 118 MMHG | HEART RATE: 76 BPM | HEIGHT: 68 IN

## 2019-03-25 DIAGNOSIS — G44.209 TENSION VASCULAR HEADACHE: ICD-10-CM

## 2019-03-25 DIAGNOSIS — G43.109 MIGRAINE WITH AURA AND WITHOUT STATUS MIGRAINOSUS, NOT INTRACTABLE: Primary | ICD-10-CM

## 2019-03-25 DIAGNOSIS — E55.9 VITAMIN D DEFICIENCY: ICD-10-CM

## 2019-03-25 DIAGNOSIS — E53.8 B12 DEFICIENCY: ICD-10-CM

## 2019-03-25 RX ORDER — RIZATRIPTAN BENZOATE 10 MG/1
10 TABLET, ORALLY DISINTEGRATING ORAL
Qty: 12 TAB | Refills: 11 | Status: SHIPPED | OUTPATIENT
Start: 2019-03-25 | End: 2020-04-21

## 2019-03-25 RX ORDER — TOPIRAMATE 100 MG/1
TABLET, FILM COATED ORAL
Qty: 180 TAB | Refills: 4 | Status: SHIPPED | OUTPATIENT
Start: 2019-03-25 | End: 2020-09-02 | Stop reason: SDUPTHER

## 2019-03-25 NOTE — PATIENT INSTRUCTIONS
A Healthy Lifestyle: Care Instructions  Your Care Instructions    A healthy lifestyle can help you feel good, stay at a healthy weight, and have plenty of energy for both work and play. A healthy lifestyle is something you can share with your whole family. A healthy lifestyle also can lower your risk for serious health problems, such as high blood pressure, heart disease, and diabetes. You can follow a few steps listed below to improve your health and the health of your family. Follow-up care is a key part of your treatment and safety. Be sure to make and go to all appointments, and call your doctor if you are having problems. It's also a good idea to know your test results and keep a list of the medicines you take. How can you care for yourself at home? · Do not eat too much sugar, fat, or fast foods. You can still have dessert and treats now and then. The goal is moderation. · Start small to improve your eating habits. Pay attention to portion sizes, drink less juice and soda pop, and eat more fruits and vegetables. ? Eat a healthy amount of food. A 3-ounce serving of meat, for example, is about the size of a deck of cards. Fill the rest of your plate with vegetables and whole grains. ? Limit the amount of soda and sports drinks you have every day. Drink more water when you are thirsty. ? Eat at least 5 servings of fruits and vegetables every day. It may seem like a lot, but it is not hard to reach this goal. A serving or helping is 1 piece of fruit, 1 cup of vegetables, or 2 cups of leafy, raw vegetables. Have an apple or some carrot sticks as an afternoon snack instead of a candy bar. Try to have fruits and/or vegetables at every meal.  · Make exercise part of your daily routine. You may want to start with simple activities, such as walking, bicycling, or slow swimming. Try to be active 30 to 60 minutes every day. You do not need to do all 30 to 60 minutes all at once.  For example, you can exercise 3 times a day for 10 or 20 minutes. Moderate exercise is safe for most people, but it is always a good idea to talk to your doctor before starting an exercise program.  · Keep moving. Bryannajane Alvarez the lawn, work in the garden, or [employer]. Take the stairs instead of the elevator at work. · If you smoke, quit. People who smoke have an increased risk for heart attack, stroke, cancer, and other lung illnesses. Quitting is hard, but there are ways to boost your chance of quitting tobacco for good. ? Use nicotine gum, patches, or lozenges. ? Ask your doctor about stop-smoking programs and medicines. ? Keep trying. In addition to reducing your risk of diseases in the future, you will notice some benefits soon after you stop using tobacco. If you have shortness of breath or asthma symptoms, they will likely get better within a few weeks after you quit. · Limit how much alcohol you drink. Moderate amounts of alcohol (up to 2 drinks a day for men, 1 drink a day for women) are okay. But drinking too much can lead to liver problems, high blood pressure, and other health problems. Family health  If you have a family, there are many things you can do together to improve your health. · Eat meals together as a family as often as possible. · Eat healthy foods. This includes fruits, vegetables, lean meats and dairy, and whole grains. · Include your family in your fitness plan. Most people think of activities such as jogging or tennis as the way to fitness, but there are many ways you and your family can be more active. Anything that makes you breathe hard and gets your heart pumping is exercise. Here are some tips:  ? Walk to do errands or to take your child to school or the bus.  ? Go for a family bike ride after dinner instead of watching TV. Where can you learn more? Go to http://manuela-yesica.info/. Enter L105 in the search box to learn more about \"A Healthy Lifestyle: Care Instructions. \"  Current as of: September 11, 2018  Content Version: 11.9  © 0954-5968 [employer] Care instructions adapted under license by [employer] (which disclaims liability or warranty for this information). If you have questions about a medical condition or this instruction, always ask your healthcare professional. [address] any warranty or liability for your use of this information. Office Policies    o Phone calls/patient messages:  Please allow up to 24 hours for someone in the office to contact you about your call or message. Be mindful your provider may be out of the office or your message may require further review. We encourage you to use [employer] for your messages as this is a faster, more efficient way to communicate with our office    o Medication Refills:  Prescription medications require up to 48 business hours to process. We encourage you to use [employer] for your refills. For controlled medications: Please allow up to 72 business hours to process. Certain medications may require you to  a written prescription at our office. NO narcotic/controlled medications will be prescribed after 4pm Monday through Friday or on weekends    o Form/Paperwork Completion:  We ask that you allow 7-14 business days. You may also download your forms to [employer] to have your doctor print off.

## 2019-03-25 NOTE — LETTER
3/25/2019 6:01 PM 
 
Patient:  Na Merrill YOB: 1955 Date of Visit: 3/25/2019 Dear No Recipients: Thank you for referring Ms. Rebbecca Paget Southers to me for evaluation/treatment. Below are the relevant portions of my assessment and plan of care. Consult Subjective:  
 
Na Merrill is a 70 y. o.right-handed  female seen for evaluation at the request of Dr. John Valdez of new problem of macrocytosis and the possibility of this may be related to her medications and seizure medications and what other causes may be causing her macrocytosis and we need to stop any of her medication. Her hematologist feels that she is stable I recommended no further workup. She still has a macrocytosis with an MCV of 102. She is not anemic. She increase her vitamin D to 2000 units a day, but no level was drawn recently. I explained to her that we need to check a B12 level and vitamin D level to see where she is. Her headaches are doing better, and the Topamax has made a significant difference. She is on Topamax 100 mg twice a day and has a low Topamax level with no side effect on the medication. She is already taking a B complex multivitamin. She may be hypothyroid. This is all being checked by her medical doctors and she is going to a hematologist.  Her headaches are doing fair, and she had a switch to the Maxalt because of insurance issues, and that is working on the headaches but not as good as the Zomig did, but she is managing. Her headaches usually occur over mainly in the frontal and sinus area associated with marked allergies and sinus congestion that occur several times a month. Her allergies have gotten somewhat better over the last few weeks, and the headaches are better and back to her normal baseline now. Her allergist retired and she does not know who to go to I suggested she see another physician in their group. She has tension vascular headaches and is currently on Topamax 100 mg twice a day and takes she is on Maxalt 10 mg as needed for the headaches and occasionally does not nasal spray of Zomig for severe headaches that she has left over. She had no new focal weakness, sensory loss, fever, meningismus, or other causes of her headaches. Her  came with her and he is very concerned about her. She denies any increased stress tension or anxiety in addition. She is retired now and should be more relaxed. She does not get the aura anymore I told her that is exactly the opposite of what should happen, at her age she should be getting less headaches and more aura. Patient last seen one year ago. Patient has a long-standing history of headaches, and has occasional exacerbation that requires Medrol Dosepak to break her headache cycle. She normally takes Zomig nasal spray good relief and takes Topamax 100 mg BID as prophylactic therapy, and aspirin 81 mg each day, magnesium oxide and coenzyme Q 10. She is also taking Zoloft 100 mg a day which helps control her headaches stress and tension. She's had normal imaging of the brain in the past, normal metabolic studies ruling out arteritis or other connective tissue disease. She has had no new focal weakness sensory loss visual changes cognitive issues gait problems or other new focal neurologic problems. Her headaches are more bitemporal, throbbing in nature associated with nausea and vomiting and occur once or twice a month, and occasionally more often. Stress and tension are precipitating factors. 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 A complete review of systems in symptoms was negative for any new medical problems complications and illnesses other than mild anxiety, depression, skin cancer, and respiratory infections. Past Medical History:  
Diagnosis Date  Headache   
 Headache(784.0)  Neurological disorder History reviewed. No pertinent surgical history. Family History Problem Relation Age of Onset  Dementia Mother  Diabetes Mother  Heart Disease Father  Other Father   
     pad  Diabetes Father  Diabetes Brother Social History Tobacco Use  Smoking status: Never Smoker  Smokeless tobacco: Never Used Substance Use Topics  Alcohol use: No  
   
Current Outpatient Medications Medication Sig Dispense Refill  topiramate (TOPAMAX) 100 mg tablet TAKE 1 TAB BY MOUTH TWO (2) TIMES A DAY. 180 Tab 4  
 rizatriptan (MAXALT-MLT) 10 mg disintegrating tablet Take 1 Tab by mouth every twelve (12) hours as needed for Migraine. 12 Tab 11  
 sertraline (ZOLOFT) 100 mg tablet TAKE 1 TAB BY MOUTH DAILY. 90 Tab 4  MULTIVITAMIN PO Take  by mouth. Women's vitafusion  triamcinolone (NASACORT AQ) 55 mcg nasal inhaler 2 Sprays as needed.  magnesium oxide (MAG-OX) 400 mg tablet Take 400 mg by mouth daily.  co-enzyme Q-10 (CO Q-10) 100 mg capsule Take 300 mg by mouth daily.  promethazine (PHENERGAN) 25 mg suppository Insert 1 Suppository into rectum every six (6) hours as needed for Nausea. 12 Suppository 2  ibuprofen (MOTRIN) 200 mg tablet Take  by mouth.  aspirin 81 mg tablet Take 81 mg by mouth.  Cholecalciferol, Vitamin D3, (VITAMIN D3) 1,000 unit cap Take  by mouth daily.  multivitamins-minerals-lutein (CENTRUM SILVER) Tab Take  by mouth. Allergies Allergen Reactions  Reglan [Metoclopramide] Anaphylaxis  Bactrim [Sulfamethoprim Ds] Rash  Pcn [Penicillins] Other (comments) Review of Systems: A comprehensive review of systems was negative except for: Neurological: positive for headaches Objective: I 
 
 
NEUROLOGICAL EXAM: 
 
Appearance: The patient is thinly developed and nourished, provides a coherent history and is in no acute distress. Mental Status: Oriented to time, place and person and the president. Speech is fluent without aphasia, and cognitive function and fund of knowledge normal for age. Mood and affect mildly depressed. Cranial Nerves:   Intact visual fields. Fundi are benign, disc are flat, no lesions seen on funduscopy. MAGI, EOM's full, no nystagmus, no ptosis. Facial sensation is normal. Corneal reflexes are not tested. Facial movement is symmetric. Hearing is normal bilaterally. Palate is midline with normal sternocleidomastoid and trapezius muscles are normal. Tongue is midline. Neck is supple without meningismus or bruits Temporal arteries not tender or enlarged Motor:  5/5 strength in upper and lower proximal and distal muscles. Normal bulk and tone. No fasciculations. Rapid alternating movement is symmetric Reflexes:   Deep tendon reflexes 2+/4 and symmetrical. No Babinski or clonus present Sensory:   Normal to touch, pinprick and vibration and temperature and DSS. Gait:  Normal gait. Tremor:   No tremor noted. Cerebellar:  No cerebellar signs present on Romberg, tandem, and finger-nose-finger exam.  
Neurovascular:  Normal heart sounds and regular rhythm, peripheral pulses decreased in both feet and no carotid bruits. Assessment:  
 
 
 
Plan: With recent problem of increasing macrocytosis, for which she is going to see a hematologist and some concern about her medications causing this problem again I reassured the patient on of her medications with seem to cause that problem. We will check her B12 level and vitamin D levels follow-up on the possible deficiency of them. Octavia Romeo Seizure medications were listed as a cause, but on my list valproic acid is the main cause, and Topamax is not even listed.  
We researched the problem and the medications in detail with the patient, and printed off articles for her on the different medications that can cause a problem, and reassured her that none of her medications are on the list. 
35 minutes spent with the patient and her , 20 minutes was spent on counseling her on macrocytosis and the fact that her medications do not cause that problem, and giving her articles of research on it. I reassured her that interactions with the triptan's or Zoloft are rare so she should be safe to continue her current medications which are providing adequate relief of her headaches. Recommended she see her allergy group and see another physician since her allergist has retired. Her medications are refilled for the patient today, her condition discussed with the patient and her  in detail Patient is to continue present medications. Patient was advised to remain mentally and physically active, exercising, take her vitamins and vitamin D and medications as prescribed. Refills given today for her medications Patient to be seen again in 12 months time or earlier if needed, and patient will call if any problems in the interim. Signed By: Vinny Varela MD   
 March 25, 2019 This note will not be viewable in [address] If you have questions, please do not hesitate to call me. I look forward to following Ms. Marion along with you. Sincerely, Vinny aVrela MD

## 2019-03-25 NOTE — PROGRESS NOTES
Consult    Subjective:     Jaime Mclean is a 70 y. o.right-handed  female seen for evaluation at the request of Dr. Cameron Rose of new problem of macrocytosis and the possibility of this may be related to her medications and seizure medications and what other causes may be causing her macrocytosis and we need to stop any of her medication. Her hematologist feels that she is stable I recommended no further workup. She still has a macrocytosis with an MCV of 102. She is not anemic. She increase her vitamin D to 2000 units a day, but no level was drawn recently. I explained to her that we need to check a B12 level and vitamin D level to see where she is. Her headaches are doing better, and the Topamax has made a significant difference. She is on Topamax 100 mg twice a day and has a low Topamax level with no side effect on the medication. She is already taking a B complex multivitamin. She may be hypothyroid. This is all being checked by her medical doctors and she is going to a hematologist.  Her headaches are doing fair, and she had a switch to the Maxalt because of insurance issues, and that is working on the headaches but not as good as the Zomig did, but she is managing. Her headaches usually occur over mainly in the frontal and sinus area associated with marked allergies and sinus congestion that occur several times a month. Her allergies have gotten somewhat better over the last few weeks, and the headaches are better and back to her normal baseline now. Her allergist retired and she does not know who to go to I suggested she see another physician in their group. She has tension vascular headaches and is currently on Topamax 100 mg twice a day and takes she is on Maxalt 10 mg as needed for the headaches and occasionally does not nasal spray of Zomig for severe headaches that she has left over. She had no new focal weakness, sensory loss, fever, meningismus, or other causes of her headaches. Her  came with her and he is very concerned about her. She denies any increased stress tension or anxiety in addition. She is retired now and should be more relaxed. She does not get the aura anymore I told her that is exactly the opposite of what should happen, at her age she should be getting less headaches and more aura. Patient last seen one year ago. Patient has a long-standing history of headaches, and has occasional exacerbation that requires Medrol Dosepak to break her headache cycle. She normally takes Zomig nasal spray good relief and takes Topamax 100 mg BID as prophylactic therapy, and aspirin 81 mg each day, magnesium oxide and coenzyme Q 10. She is also taking Zoloft 100 mg a day which helps control her headaches stress and tension. She's had normal imaging of the brain in the past, normal metabolic studies ruling out arteritis or other connective tissue disease. She has had no new focal weakness sensory loss visual changes cognitive issues gait problems or other new focal neurologic problems. Her headaches are more bitemporal, throbbing in nature associated with nausea and vomiting and occur once or twice a month, and occasionally more often. Stress and tension are precipitating factors. 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 A complete review of systems in symptoms was negative for any new medical problems complications and illnesses other than mild anxiety, depression, skin cancer, and respiratory infections. Past Medical History:   Diagnosis Date    Headache     Headache(784.0)     Neurological disorder       History reviewed. No pertinent surgical history.   Family History   Problem Relation Age of Onset   Yessica Fester Dementia Mother     Diabetes Mother     Heart Disease Father     Other Father         pad    Diabetes Father     Diabetes Brother       Social History     Tobacco Use    Smoking status: Never Smoker    Smokeless tobacco: Never Used   Substance Use Topics    Alcohol use: No       Current Outpatient Medications   Medication Sig Dispense Refill    topiramate (TOPAMAX) 100 mg tablet TAKE 1 TAB BY MOUTH TWO (2) TIMES A DAY. 180 Tab 4    rizatriptan (MAXALT-MLT) 10 mg disintegrating tablet Take 1 Tab by mouth every twelve (12) hours as needed for Migraine. 12 Tab 11    sertraline (ZOLOFT) 100 mg tablet TAKE 1 TAB BY MOUTH DAILY. 90 Tab 4    MULTIVITAMIN PO Take  by mouth. Women's vitafusion      triamcinolone (NASACORT AQ) 55 mcg nasal inhaler 2 Sprays as needed.  magnesium oxide (MAG-OX) 400 mg tablet Take 400 mg by mouth daily.  co-enzyme Q-10 (CO Q-10) 100 mg capsule Take 300 mg by mouth daily.  promethazine (PHENERGAN) 25 mg suppository Insert 1 Suppository into rectum every six (6) hours as needed for Nausea. 12 Suppository 2    ibuprofen (MOTRIN) 200 mg tablet Take  by mouth.  aspirin 81 mg tablet Take 81 mg by mouth.  Cholecalciferol, Vitamin D3, (VITAMIN D3) 1,000 unit cap Take  by mouth daily.  multivitamins-minerals-lutein (CENTRUM SILVER) Tab Take  by mouth. Allergies   Allergen Reactions    Reglan [Metoclopramide] Anaphylaxis    Bactrim [Sulfamethoprim Ds] Rash    Pcn [Penicillins] Other (comments)        Review of Systems:  A comprehensive review of systems was negative except for: Neurological: positive for headaches     Objective:     I      NEUROLOGICAL EXAM:    Appearance: The patient is thinly developed and nourished, provides a coherent history and is in no acute distress. Mental Status: Oriented to time, place and person and the president. Speech is fluent without aphasia, and cognitive function and fund of knowledge normal for age. Mood and affect mildly depressed. Cranial Nerves:   Intact visual fields. Fundi are benign, disc are flat, no lesions seen on funduscopy. MAGI, EOM's full, no nystagmus, no ptosis.  Facial sensation is normal. Corneal reflexes are not tested. Facial movement is symmetric. Hearing is normal bilaterally. Palate is midline with normal sternocleidomastoid and trapezius muscles are normal. Tongue is midline. Neck is supple without meningismus or bruits  Temporal arteries not tender or enlarged    Motor:  5/5 strength in upper and lower proximal and distal muscles. Normal bulk and tone. No fasciculations. Rapid alternating movement is symmetric   Reflexes:   Deep tendon reflexes 2+/4 and symmetrical. No Babinski or clonus present   Sensory:   Normal to touch, pinprick and vibration and temperature and DSS. Gait:  Normal gait. Tremor:   No tremor noted. Cerebellar:  No cerebellar signs present on Romberg, tandem, and finger-nose-finger exam.   Neurovascular:  Normal heart sounds and regular rhythm, peripheral pulses decreased in both feet and no carotid bruits. Assessment:         Plan: With recent problem of increasing macrocytosis, for which she is going to see a hematologist and some concern about her medications causing this problem again I reassured the patient on of her medications with seem to cause that problem. We will check her B12 level and vitamin D levels follow-up on the possible deficiency of them. .  Seizure medications were listed as a cause, but on my list valproic acid is the main cause, and Topamax is not even listed. We researched the problem and the medications in detail with the patient, and printed off articles for her on the different medications that can cause a problem, and reassured her that none of her medications are on the list.  35 minutes spent with the patient and her , 20 minutes was spent on counseling her on macrocytosis and the fact that her medications do not cause that problem, and giving her articles of research on it.   I reassured her that interactions with the triptan's or Zoloft are rare so she should be safe to continue her current medications which are providing adequate relief of her headaches. Recommended she see her allergy group and see another physician since her allergist has retired. Her medications are refilled for the patient today, her condition discussed with the patient and her  in detail  Patient is to continue present medications. Patient was advised to remain mentally and physically active, exercising, take her vitamins and vitamin D and medications as prescribed. Refills given today for her medications  Patient to be seen again in 12 months time or earlier if needed, and patient will call if any problems in the interim. Signed By: Roselyn Herbert MD     March 25, 2019       This note will not be viewable in [address]

## 2019-03-26 DIAGNOSIS — G43.109 MIGRAINE WITH AURA AND WITHOUT STATUS MIGRAINOSUS, NOT INTRACTABLE: ICD-10-CM

## 2019-03-26 LAB
25(OH)D3+25(OH)D2 SERPL-MCNC: 56.6 NG/ML (ref 30–100)
FOLATE SERPL-MCNC: >20 NG/ML
VIT B12 SERPL-MCNC: 778 PG/ML (ref 232–1245)

## 2019-03-26 RX ORDER — SERTRALINE HYDROCHLORIDE 100 MG/1
TABLET, FILM COATED ORAL
Qty: 90 TAB | Refills: 4 | Status: SHIPPED | OUTPATIENT
Start: 2019-03-26 | End: 2019-06-18 | Stop reason: SDUPTHER

## 2019-06-18 DIAGNOSIS — G43.109 MIGRAINE WITH AURA AND WITHOUT STATUS MIGRAINOSUS, NOT INTRACTABLE: ICD-10-CM

## 2019-06-18 RX ORDER — SERTRALINE HYDROCHLORIDE 100 MG/1
TABLET, FILM COATED ORAL
Qty: 90 TAB | Refills: 4 | Status: SHIPPED | OUTPATIENT
Start: 2019-06-18 | End: 2020-08-14 | Stop reason: SDUPTHER

## 2019-07-01 ENCOUNTER — TELEPHONE (OUTPATIENT)
Dept: NEUROLOGY | Age: 64
End: 2019-07-01

## 2019-07-01 NOTE — TELEPHONE ENCOUNTER
Patients  called states that pt is having bad head pain and would like to marizol an apptmt.  Please advise

## 2019-07-02 ENCOUNTER — TELEPHONE (OUTPATIENT)
Dept: NEUROLOGY | Age: 64
End: 2019-07-02

## 2019-07-02 NOTE — TELEPHONE ENCOUNTER
I called the patient' . Has about 10-30 severe head pains in a short period of time. Then about 3 hours later happened again. Shooting pains/stabbing pains right above right eye where she normally gets migraines, but these feel different form this. Called the PCP and then was advised to go to the ER. Went to [facility] on [address]. Was diagnosed with neuralgia and then suggested see the neurologist.  Currently on prednisone pack. Doing better. Residual pressure/pain but better than yesterday. Taking topamax twice a day. Has maxalt to use as needed. Did not use this because this was totally different.     Please review and advise

## 2019-07-03 NOTE — TELEPHONE ENCOUNTER
I called the patient, she is having stabbing pains over her right eye, and the ER diagnoses neuralgia and suggested she see her neurologist.  She is on a Medrol Dosepak and doing better now, so I told her to come in at 11:00 on Friday morning July 5    Regina Varma, can you schedule patient for 11 AM July 5 for me to see.   I have already told the patient

## 2019-07-05 ENCOUNTER — OFFICE VISIT (OUTPATIENT)
Dept: NEUROLOGY | Age: 64
End: 2019-07-05

## 2019-07-05 VITALS
HEART RATE: 70 BPM | SYSTOLIC BLOOD PRESSURE: 138 MMHG | HEIGHT: 68 IN | DIASTOLIC BLOOD PRESSURE: 70 MMHG | WEIGHT: 140 LBS | OXYGEN SATURATION: 98 % | BODY MASS INDEX: 21.22 KG/M2

## 2019-07-05 DIAGNOSIS — G43.109 MIGRAINE WITH AURA AND WITHOUT STATUS MIGRAINOSUS, NOT INTRACTABLE: Primary | ICD-10-CM

## 2019-07-05 DIAGNOSIS — G51.8 FACIAL NEURALGIA: ICD-10-CM

## 2019-07-05 RX ORDER — GABAPENTIN 100 MG/1
100 CAPSULE ORAL 3 TIMES DAILY
Qty: 100 CAP | Refills: 11 | Status: SHIPPED | OUTPATIENT
Start: 2019-07-05 | End: 2021-07-13 | Stop reason: ALTCHOICE

## 2019-07-05 NOTE — PROGRESS NOTES
Consult    Subjective:     Fitz Mares is a 75 y. o.right-handed  female seen for urgent work in evaluation at the request of Dr. Nav Farrell of new problem of patient having new onset of facial pain that occurred about 1 week ago, with stabbing pain in and around her right eye that was lancinating and stabbing in character, and has persisted despite being given a Medrol Dosepak. It is now described as a deep ache and around the right eye. She had a normal CT of the head in the emergency room, and was given a Medrol Dosepak, and her metabolic studies were all normal too. She has never had this type of pain but she does have chronic migraines. She was diagnosed with neuralgia, and she has no other associated new neurological deficits, but the pain is very bothersome to her and scares her and her  and they are concerned she has a brain tumor or some other problem. We will check an MRI scan and an MRI of the brain to make sure there is no other cause of her symptoms. She may have a looping blood vessel or tumor of the posterior fossa as a cause of her symptoms. We will also give her gabapentin 100 mg 3 times a day to take. She has had a work-up for macrocytosis and all the work-up was negative. She has seen a hematologist.  For her headaches she is on Topamax 100 mg twice a day and has a low Topamax level with no side effect on the medication. She is already taking a B complex multivitamin. She may be hypothyroid. This is all being checked by her medical doctors and she is going to a hematologist.  Her headaches are doing fair, and she had a switch to the MaxalExotel because of insurance issues, and that is working on the headaches but not as good as the Zomig did, but she is managing. Her headaches usually occur over mainly in the frontal and sinus area associated with marked allergies and sinus congestion that occur several times a month.   Her allergies have gotten somewhat better over the last few weeks, and the headaches are better and back to her normal baseline now. Her allergist retired and she does not know who to go to I suggested she see another physician in their group. She had no new focal weakness, sensory loss, fever, meningismus, or other causes of her headaches. Her  came with her and he is very concerned about her. She denies any increased stress tension or anxiety in addition. She is retired now and should be more relaxed. She does not get the aura anymore I told her that is exactly the opposite of what should happen, at her age she should be getting less headaches and more aura. Patient last seen one year ago. Patient has a long-standing history of headaches, and has occasional exacerbation that requires Medrol Dosepak to break her headache cycle. Her headaches are more bitemporal, throbbing in nature associated with nausea and vomiting and occur once or twice a month, and occasionally more often. Stress and tension are precipitating factors. 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 A complete review of systems in symptoms was negative for any new medical problems complications and illnesses other than mild anxiety, depression, skin cancer, and respiratory infections. Past Medical History:   Diagnosis Date    Headache     Headache(784.0)     Neurological disorder       History reviewed. No pertinent surgical history. Family History   Problem Relation Age of Onset    Dementia Mother     Diabetes Mother     Heart Disease Father     Other Father         pad    Diabetes Father     Diabetes Brother       Social History     Tobacco Use    Smoking status: Never Smoker    Smokeless tobacco: Never Used   Substance Use Topics    Alcohol use: No       Current Outpatient Medications   Medication Sig Dispense Refill    gabapentin (NEURONTIN) 100 mg capsule Take 1 Cap by mouth three (3) times daily.  Max Daily Amount: 300 mg. 100 Cap 11    sertraline (ZOLOFT) 100 mg tablet TAKE 1 TABLET BY MOUTH EVERY DAY 90 Tab 4    topiramate (TOPAMAX) 100 mg tablet TAKE 1 TAB BY MOUTH TWO (2) TIMES A DAY. 180 Tab 4    rizatriptan (MAXALT-MLT) 10 mg disintegrating tablet Take 1 Tab by mouth every twelve (12) hours as needed for Migraine. 12 Tab 11    MULTIVITAMIN PO Take  by mouth. Women's vitafusion      triamcinolone (NASACORT AQ) 55 mcg nasal inhaler 2 Sprays as needed.  magnesium oxide (MAG-OX) 400 mg tablet Take 400 mg by mouth daily.  co-enzyme Q-10 (CO Q-10) 100 mg capsule Take 300 mg by mouth daily.  promethazine (PHENERGAN) 25 mg suppository Insert 1 Suppository into rectum every six (6) hours as needed for Nausea. 12 Suppository 2    ibuprofen (MOTRIN) 200 mg tablet Take  by mouth.  Cholecalciferol, Vitamin D3, (VITAMIN D3) 1,000 unit cap Take  by mouth daily.  aspirin 81 mg tablet Take 81 mg by mouth.  multivitamins-minerals-lutein (CENTRUM SILVER) Tab Take  by mouth. Allergies   Allergen Reactions    Reglan [Metoclopramide] Anaphylaxis    Bactrim [Sulfamethoprim Ds] Rash    Pcn [Penicillins] Other (comments)        Review of Systems:  A comprehensive review of systems was negative except for: Neurological: positive for headaches     Objective:     I      NEUROLOGICAL EXAM:    Appearance: The patient is thinly developed and nourished, provides a coherent history and is in no acute distress. Mental Status: Oriented to time, place and person and the president. Speech is fluent without aphasia, and cognitive function and fund of knowledge normal for age. Mood and affect mildly depressed. Cranial Nerves:   Intact visual fields. Fundi are benign, disc are flat, no lesions seen on funduscopy. MAGI, EOM's full, no nystagmus, no ptosis. Facial sensation is normal. Corneal reflexes are not tested. Facial movement is symmetric. Hearing is normal bilaterally.  Palate is midline with normal sternocleidomastoid and trapezius muscles are normal. Tongue is midline. Neck is supple without meningismus or bruits  Temporal arteries not tender or enlarged   Patient is tenderness on palpation over the supraorbital nerve on the right side   Motor:  5/5 strength in upper and lower proximal and distal muscles. Normal bulk and tone. No fasciculations. Rapid alternating movement is symmetric   Reflexes:   Deep tendon reflexes 2+/4 and symmetrical. No Babinski or clonus present   Sensory:   Normal to touch, pinprick and vibration and temperature and DSS. Gait:  Normal gait. Tremor:   No tremor noted. Cerebellar:  No cerebellar signs present on Romberg, tandem, and finger-nose-finger exam.   Neurovascular:  Normal heart sounds and regular rhythm, peripheral pulses decreased in both feet and no carotid bruits. Assessment:         Plan: With new problem of stabbing and neuralgic type pain over the right eye, etiology unclear, worry about vascular malformation, new posterior fossa tumor, or other causes of this severe pain. It has gotten a little better with Medrol Dosepak but still persists and the patient is afraid of an aneurysm or tumor. We will check an MRI and MRA, try her on gabapentin 100 mg 3 times a day, and she will continue her migraine medication as before. She just had a normal CT of the head and normal CBC and differential and sed rate, and all the rest of her metabolic studies were normal.  Her macrocytosis had a negative work-up with hematology. I reassured her that interactions with the triptan's or Zoloft are rare so she should be safe to continue her current medications which are providing adequate relief of her headaches. Recommended she see her allergy group and see another physician since her allergist has retired.   Her medications are refilled for the patient today, her condition discussed with the patient and her  in detail  Patient is to continue present medications. Patient was advised to remain mentally and physically active, exercising, take her vitamins and vitamin D and medications as prescribed. Refills given today for her medications  Patient to be seen again in 3 months time or earlier if needed, and patient will call if any problems in the interim. 45 minutes spent reviewing all the records in the emergency room, examined the patient and discussing her diagnosis and prognosis and treatment options. Signed By: Scottie Larsen MD     July 5, 2019       This note will not be viewable in [address]

## 2019-07-05 NOTE — PATIENT INSTRUCTIONS

## 2019-07-12 ENCOUNTER — HOSPITAL ENCOUNTER (OUTPATIENT)
Dept: MRI IMAGING | Age: 64
Discharge: HOME | End: 2019-07-12
Attending: PSYCHIATRY & NEUROLOGY
Payer: MEDICARE

## 2019-07-12 ENCOUNTER — DOCUMENTATION ONLY (OUTPATIENT)
Dept: NEUROLOGY | Age: 64
End: 2019-07-12

## 2019-07-12 DIAGNOSIS — G51.8 FACIAL NEURALGIA: ICD-10-CM

## 2019-07-12 DIAGNOSIS — G43.109 MIGRAINE WITH AURA AND WITHOUT STATUS MIGRAINOSUS, NOT INTRACTABLE: ICD-10-CM

## 2019-07-12 PROCEDURE — 74011250636 HC RX REV CODE- 250/636: Performed by: PSYCHIATRY & NEUROLOGY

## 2019-07-12 PROCEDURE — 70544 MR ANGIOGRAPHY HEAD W/O DYE: CPT

## 2019-07-12 PROCEDURE — 70553 MRI BRAIN STEM W/O & W/DYE: CPT

## 2019-07-12 PROCEDURE — A9575 INJ GADOTERATE MEGLUMI 0.1ML: HCPCS | Performed by: PSYCHIATRY & NEUROLOGY

## 2019-07-12 RX ORDER — GADOTERATE MEGLUMINE 376.9 MG/ML
13 INJECTION INTRAVENOUS ONCE
Status: COMPLETED | OUTPATIENT
Start: 2019-07-12 | End: 2019-07-12

## 2019-07-12 RX ADMIN — GADOTERATE MEGLUMINE 13 ML: 376.9 INJECTION INTRAVENOUS at 14:43

## 2019-07-12 NOTE — PROGRESS NOTES
I called the patient and told her MRI and MRA of the brain were normal except for minimal white matter disease consistent with her headaches. No new structural lesion for her neuralgia, she will continue the medication and hopefully the reassurance will help her.

## 2020-03-17 ENCOUNTER — TELEPHONE (OUTPATIENT)
Dept: NEUROLOGY | Age: 65
End: 2020-03-17

## 2020-04-21 DIAGNOSIS — G44.209 TENSION VASCULAR HEADACHE: ICD-10-CM

## 2020-04-21 DIAGNOSIS — E53.8 B12 DEFICIENCY: ICD-10-CM

## 2020-04-21 DIAGNOSIS — E55.9 VITAMIN D DEFICIENCY: ICD-10-CM

## 2020-04-21 DIAGNOSIS — G43.109 MIGRAINE WITH AURA AND WITHOUT STATUS MIGRAINOSUS, NOT INTRACTABLE: ICD-10-CM

## 2020-04-21 RX ORDER — RIZATRIPTAN BENZOATE 10 MG/1
10 TABLET, ORALLY DISINTEGRATING ORAL
Qty: 12 TAB | Refills: 11 | Status: SHIPPED | OUTPATIENT
Start: 2020-04-21 | End: 2021-01-07 | Stop reason: SDUPTHER

## 2020-08-14 DIAGNOSIS — G43.109 MIGRAINE WITH AURA AND WITHOUT STATUS MIGRAINOSUS, NOT INTRACTABLE: ICD-10-CM

## 2020-08-14 RX ORDER — SERTRALINE HYDROCHLORIDE 100 MG/1
TABLET, FILM COATED ORAL
Qty: 90 TAB | Refills: 4 | Status: SHIPPED | OUTPATIENT
Start: 2020-08-14 | End: 2021-07-13 | Stop reason: SDUPTHER

## 2020-08-14 NOTE — TELEPHONE ENCOUNTER
Future Appointments   Date Time Provider Mulu Albina   9/8/2020 11:00 AM Luly Mane MD [facility]                         Last Appointment My Department:  7/5/2019  Please review and send in refill below if warranted.     Requested Prescriptions     Pending Prescriptions Disp Refills    sertraline (ZOLOFT) 100 mg tablet 90 Tab 4     Sig: TAKE 1 TABLET BY MOUTH EVERY DAY

## 2020-09-02 DIAGNOSIS — E53.8 B12 DEFICIENCY: ICD-10-CM

## 2020-09-02 DIAGNOSIS — G44.209 TENSION VASCULAR HEADACHE: ICD-10-CM

## 2020-09-02 DIAGNOSIS — E55.9 VITAMIN D DEFICIENCY: ICD-10-CM

## 2020-09-02 DIAGNOSIS — G43.109 MIGRAINE WITH AURA AND WITHOUT STATUS MIGRAINOSUS, NOT INTRACTABLE: ICD-10-CM

## 2020-09-02 RX ORDER — TOPIRAMATE 100 MG/1
TABLET, FILM COATED ORAL
Qty: 180 TAB | Refills: 4 | Status: SHIPPED | OUTPATIENT
Start: 2020-09-02 | End: 2021-07-13 | Stop reason: SDUPTHER

## 2020-09-08 ENCOUNTER — OFFICE VISIT (OUTPATIENT)
Dept: NEUROLOGY | Age: 65
End: 2020-09-08
Payer: MEDICARE

## 2020-09-08 VITALS
BODY MASS INDEX: 21.52 KG/M2 | OXYGEN SATURATION: 97 % | RESPIRATION RATE: 12 BRPM | SYSTOLIC BLOOD PRESSURE: 116 MMHG | HEIGHT: 68 IN | WEIGHT: 142 LBS | HEART RATE: 75 BPM | DIASTOLIC BLOOD PRESSURE: 68 MMHG

## 2020-09-08 DIAGNOSIS — G44.209 TENSION VASCULAR HEADACHE: ICD-10-CM

## 2020-09-08 DIAGNOSIS — G43.109 MIGRAINE WITH AURA AND WITHOUT STATUS MIGRAINOSUS, NOT INTRACTABLE: Primary | ICD-10-CM

## 2020-09-08 DIAGNOSIS — E53.8 B12 DEFICIENCY: ICD-10-CM

## 2020-09-08 DIAGNOSIS — G51.8 FACIAL NEURALGIA: ICD-10-CM

## 2020-09-08 PROCEDURE — G8420 CALC BMI NORM PARAMETERS: HCPCS | Performed by: PSYCHIATRY & NEUROLOGY

## 2020-09-08 PROCEDURE — G8427 DOCREV CUR MEDS BY ELIG CLIN: HCPCS | Performed by: PSYCHIATRY & NEUROLOGY

## 2020-09-08 PROCEDURE — G8536 NO DOC ELDER MAL SCRN: HCPCS | Performed by: PSYCHIATRY & NEUROLOGY

## 2020-09-08 PROCEDURE — 99213 OFFICE O/P EST LOW 20 MIN: CPT | Performed by: PSYCHIATRY & NEUROLOGY

## 2020-09-08 PROCEDURE — 3017F COLORECTAL CA SCREEN DOC REV: CPT | Performed by: PSYCHIATRY & NEUROLOGY

## 2020-09-08 PROCEDURE — G8510 SCR DEP NEG, NO PLAN REQD: HCPCS | Performed by: PSYCHIATRY & NEUROLOGY

## 2020-09-08 PROCEDURE — 1101F PT FALLS ASSESS-DOCD LE1/YR: CPT | Performed by: PSYCHIATRY & NEUROLOGY

## 2020-09-08 PROCEDURE — G8400 PT W/DXA NO RESULTS DOC: HCPCS | Performed by: PSYCHIATRY & NEUROLOGY

## 2020-09-08 PROCEDURE — 1090F PRES/ABSN URINE INCON ASSESS: CPT | Performed by: PSYCHIATRY & NEUROLOGY

## 2020-09-08 RX ORDER — MONTELUKAST SODIUM 10 MG/1
TABLET ORAL
COMMUNITY
Start: 2020-07-20

## 2020-09-08 NOTE — LETTER
9/8/20 Patient: Monserrat Barney YOB: 1955 Date of Visit: 9/8/2020 Angie Dominguez MD 
[address] [place] [address] [phone] [id number] VIA In Basket Dear Angie Dominguez MD, Thank you for referring Ms. Jose Marion to [address] [place] [address] for evaluation. My notes for this consultation are attached. Consult Subjective:  
 
Monserrat Barney is a 77 y. o.right-handed  female seen for evaluation at the request of Dr. Catherine Talley of problem of facial pain that occurred last year, and for which she had an MRI of the brain that was unremarkable and an MRA of the brain that was unremarkable, and she was given gabapentin 300 mg 3 times a day, and after several weeks of therapy the pain went away and she went off the medication and has done well since then. She is also seen for her headaches, they seem to be doing better on Topamax 100 mg twice a day, and she rarely has a severe migraine and takes Maxalt as needed, and no longer uses the Phenergan. She does complain of a slight imbalance so we will check her Topamax level but most likely this is just age-related, we advised her she needs to continue to exercise and lose balance exercises to. She is already taking a B complex multivitamin. Her headaches are doing fair, and she had a switch to the Maxalt because of insurance issues, and that is working on the headaches but not as good as the Zomig did, but she is managing. Her headaches usually occur over mainly in the frontal and sinus area associated with marked allergies and sinus congestion that occur several times a month. Her allergies have gotten somewhat better over the last few weeks, and the headaches are better and back to her normal baseline now. Her allergist retired and she does not know who to go to I suggested she see another physician in their group.    She had no new focal weakness, sensory loss, fever, meningismus, or other causes of her headaches. Her  came with her and he is very concerned about her. She denies any increased stress tension or anxiety in addition. She is retired now and should be more relaxed. She does not get the aura anymore I told her that is exactly the opposite of what should happen, at her age she should be getting less headaches and more aura. Patient last seen one year ago. Patient has a long-standing history of headaches, and has occasional exacerbation that requires Medrol Dosepak to break her headache cycle. Her headaches are more bitemporal, throbbing in nature associated with nausea and vomiting and occur once or twice a month, and occasionally more often. Stress and tension are precipitating factors. 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 A complete review of systems in symptoms was negative for any new medical problems complications and illnesses other than mild anxiety, depression, skin cancer, and respiratory infections. Past Medical History:  
Diagnosis Date  Headache   
 Headache(784.0)  Neurological disorder History reviewed. No pertinent surgical history. Family History Problem Relation Age of Onset  Dementia Mother  Diabetes Mother  Heart Disease Father  Other Father   
     pad  Diabetes Father  Diabetes Brother Social History Tobacco Use  Smoking status: Never Smoker  Smokeless tobacco: Never Used Substance Use Topics  Alcohol use: No  
   
Current Outpatient Medications Medication Sig Dispense Refill  montelukast (SINGULAIR) 10 mg tablet TAKE 1 TABLET BY MOUTH EVERY DAY  topiramate (TOPAMAX) 100 mg tablet TAKE 1 TAB BY MOUTH TWO (2) TIMES A DAY.  180 Tab 4  
 sertraline (ZOLOFT) 100 mg tablet TAKE 1 TABLET BY MOUTH EVERY DAY 90 Tab 4  
 rizatriptan (MAXALT-MLT) 10 mg disintegrating tablet TAKE 1 TAB BY MOUTH EVERY TWELVE (12) HOURS AS NEEDED FOR MIGRAINE. 12 Tab 11  
 gabapentin (NEURONTIN) 100 mg capsule Take 1 Cap by mouth three (3) times daily. Max Daily Amount: 300 mg. 100 Cap 11  
 triamcinolone (NASACORT AQ) 55 mcg nasal inhaler 2 Sprays as needed.  magnesium oxide (MAG-OX) 400 mg tablet Take 400 mg by mouth daily.  co-enzyme Q-10 (CO Q-10) 100 mg capsule Take 300 mg by mouth daily.  ibuprofen (MOTRIN) 200 mg tablet Take  by mouth.  Cholecalciferol, Vitamin D3, (VITAMIN D3) 1,000 unit cap Take  by mouth daily.  multivitamins-minerals-lutein (CENTRUM SILVER) Tab Take  by mouth. Allergies Allergen Reactions  Reglan [Metoclopramide] Anaphylaxis  Bactrim [Sulfamethoprim Ds] Rash  Pcn [Penicillins] Other (comments) Review of Systems: A comprehensive review of systems was negative except for: Neurological: positive for headaches Objective: I 
 
 
NEUROLOGICAL EXAM: 
 
Appearance: The patient is thinly developed and nourished, provides a coherent history and is in no acute distress. Mental Status: Oriented to time, place and person and the president. Speech is fluent without aphasia, and cognitive function and fund of knowledge normal for age. Mood and affect mildly depressed. Cranial Nerves:   Intact visual fields. Fundi are benign, disc are flat, no lesions seen on funduscopy. MAGI, EOM's full, no nystagmus, no ptosis. Facial sensation is normal. Corneal reflexes are not tested. Facial movement is symmetric. Hearing is normal bilaterally. Palate is midline with normal sternocleidomastoid and trapezius muscles are normal. Tongue is midline. Neck is supple without meningismus or bruits Temporal arteries not tender or enlarged Patient is tenderness on palpation over the supraorbital nerve on the right side Motor:  5/5 strength in upper and lower proximal and distal muscles. Normal bulk and tone. No fasciculations. Rapid alternating movement is symmetric Reflexes:   Deep tendon reflexes 2+/4 and symmetrical. No Babinski or clonus present Sensory:   Normal to touch, pinprick and vibration and temperature and DSS. Gait:  Normal gait. Tremor:   No tremor noted. Cerebellar:  No cerebellar signs present on Romberg, tandem, and finger-nose-finger exam.  
Neurovascular:  Normal heart sounds and regular rhythm, peripheral pulses decreased in both feet and no carotid bruits. Assessment:  
 
 
 
Plan:  
 
Patient's facial pain is doing much better with previous treatment, and he no longer needs the medication of Neurontin, and she had a normal MRI of the brain and MRA of the brain and evaluation of that last year. Migraine seem to be well controlled on Topamax 100 mg twice a day and her as needed Maxalt she will continue those and will check a level because of her sensation of slight unsteadiness at times. I reassured her that interactions with the triptan's or Zoloft are rare so she should be safe to continue her current medications which are providing adequate relief of her headaches. Recommended she see her allergy group and see another physician since her allergist has retired. Her medications are refilled for the patient today, her condition discussed with the patient in detail Patient is to continue present medications. Patient was advised to remain mentally and physically active, exercising, take her vitamins and vitamin D and medications as prescribed. Refills given today for her medications Patient to be seen again in 12 months time or earlier if needed, and patient will call if any problems in the interim. Signed By: Rohini Celeste MD   
 September 8, 2020 This note will not be viewable in [address] If you have questions, please do not hesitate to call me. I look forward to following your patient along with you. Sincerely, Rohini Celeste MD

## 2020-09-08 NOTE — PROGRESS NOTES
Consult    Subjective:     Brittney Rios is a 77 y. o.right-handed  female seen for evaluation at the request of Dr. Hallie Cruz of problem of facial pain that occurred last year, and for which she had an MRI of the brain that was unremarkable and an MRA of the brain that was unremarkable, and she was given gabapentin 300 mg 3 times a day, and after several weeks of therapy the pain went away and she went off the medication and has done well since then. She is also seen for her headaches, they seem to be doing better on Topamax 100 mg twice a day, and she rarely has a severe migraine and takes Maxalt as needed, and no longer uses the Phenergan. She does complain of a slight imbalance so we will check her Topamax level but most likely this is just age-related, we advised her she needs to continue to exercise and lose balance exercises to. She is already taking a B complex multivitamin. Her headaches are doing fair, and she had a switch to the Maxalt because of insurance issues, and that is working on the headaches but not as good as the Zomig did, but she is managing. Her headaches usually occur over mainly in the frontal and sinus area associated with marked allergies and sinus congestion that occur several times a month. Her allergies have gotten somewhat better over the last few weeks, and the headaches are better and back to her normal baseline now. Her allergist retired and she does not know who to go to I suggested she see another physician in their group. She had no new focal weakness, sensory loss, fever, meningismus, or other causes of her headaches. Her  came with her and he is very concerned about her. She denies any increased stress tension or anxiety in addition. She is retired now and should be more relaxed. She does not get the aura anymore I told her that is exactly the opposite of what should happen, at her age she should be getting less headaches and more aura.   Patient last seen one year ago. Patient has a long-standing history of headaches, and has occasional exacerbation that requires Medrol Dosepak to break her headache cycle. Her headaches are more bitemporal, throbbing in nature associated with nausea and vomiting and occur once or twice a month, and occasionally more often. Stress and tension are precipitating factors. 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 A complete review of systems in symptoms was negative for any new medical problems complications and illnesses other than mild anxiety, depression, skin cancer, and respiratory infections. Past Medical History:   Diagnosis Date    Headache     Headache(784.0)     Neurological disorder       History reviewed. No pertinent surgical history. Family History   Problem Relation Age of Onset    Dementia Mother     Diabetes Mother     Heart Disease Father     Other Father         pad    Diabetes Father     Diabetes Brother       Social History     Tobacco Use    Smoking status: Never Smoker    Smokeless tobacco: Never Used   Substance Use Topics    Alcohol use: No       Current Outpatient Medications   Medication Sig Dispense Refill    montelukast (SINGULAIR) 10 mg tablet TAKE 1 TABLET BY MOUTH EVERY DAY      topiramate (TOPAMAX) 100 mg tablet TAKE 1 TAB BY MOUTH TWO (2) TIMES A DAY. 180 Tab 4    sertraline (ZOLOFT) 100 mg tablet TAKE 1 TABLET BY MOUTH EVERY DAY 90 Tab 4    rizatriptan (MAXALT-MLT) 10 mg disintegrating tablet TAKE 1 TAB BY MOUTH EVERY TWELVE (12) HOURS AS NEEDED FOR MIGRAINE. 12 Tab 11    gabapentin (NEURONTIN) 100 mg capsule Take 1 Cap by mouth three (3) times daily. Max Daily Amount: 300 mg. 100 Cap 11    triamcinolone (NASACORT AQ) 55 mcg nasal inhaler 2 Sprays as needed.  magnesium oxide (MAG-OX) 400 mg tablet Take 400 mg by mouth daily.  co-enzyme Q-10 (CO Q-10) 100 mg capsule Take 300 mg by mouth daily.       ibuprofen (MOTRIN) 200 mg tablet Take  by mouth.  Cholecalciferol, Vitamin D3, (VITAMIN D3) 1,000 unit cap Take  by mouth daily.  multivitamins-minerals-lutein (CENTRUM SILVER) Tab Take  by mouth. Allergies   Allergen Reactions    Reglan [Metoclopramide] Anaphylaxis    Bactrim [Sulfamethoprim Ds] Rash    Pcn [Penicillins] Other (comments)        Review of Systems:  A comprehensive review of systems was negative except for: Neurological: positive for headaches     Objective:     I      NEUROLOGICAL EXAM:    Appearance: The patient is thinly developed and nourished, provides a coherent history and is in no acute distress. Mental Status: Oriented to time, place and person and the president. Speech is fluent without aphasia, and cognitive function and fund of knowledge normal for age. Mood and affect mildly depressed. Cranial Nerves:   Intact visual fields. Fundi are benign, disc are flat, no lesions seen on funduscopy. MAGI, EOM's full, no nystagmus, no ptosis. Facial sensation is normal. Corneal reflexes are not tested. Facial movement is symmetric. Hearing is normal bilaterally. Palate is midline with normal sternocleidomastoid and trapezius muscles are normal. Tongue is midline. Neck is supple without meningismus or bruits  Temporal arteries not tender or enlarged   Patient is tenderness on palpation over the supraorbital nerve on the right side   Motor:  5/5 strength in upper and lower proximal and distal muscles. Normal bulk and tone. No fasciculations. Rapid alternating movement is symmetric   Reflexes:   Deep tendon reflexes 2+/4 and symmetrical. No Babinski or clonus present   Sensory:   Normal to touch, pinprick and vibration and temperature and DSS. Gait:  Normal gait. Tremor:   No tremor noted.    Cerebellar:  No cerebellar signs present on Romberg, tandem, and finger-nose-finger exam.   Neurovascular:  Normal heart sounds and regular rhythm, peripheral pulses decreased in both feet and no carotid bruits. Assessment:         Plan:     Patient's facial pain is doing much better with previous treatment, and he no longer needs the medication of Neurontin, and she had a normal MRI of the brain and MRA of the brain and evaluation of that last year. Migraine seem to be well controlled on Topamax 100 mg twice a day and her as needed Maxalt she will continue those and will check a level because of her sensation of slight unsteadiness at times. I reassured her that interactions with the triptan's or Zoloft are rare so she should be safe to continue her current medications which are providing adequate relief of her headaches. Recommended she see her allergy group and see another physician since her allergist has retired. Her medications are refilled for the patient today, her condition discussed with the patient in detail  Patient is to continue present medications. Patient was advised to remain mentally and physically active, exercising, take her vitamins and vitamin D and medications as prescribed. Refills given today for her medications  Patient to be seen again in 12 months time or earlier if needed, and patient will call if any problems in the interim. Signed By: Jared Hogue MD     September 8, 2020       This note will not be viewable in [address]

## 2020-09-09 LAB — TOPIRAMATE SERPL-MCNC: 5.3 UG/ML (ref 2–25)

## 2020-11-23 ENCOUNTER — TELEPHONE (OUTPATIENT)
Dept: NEUROLOGY | Age: 65
End: 2020-11-23

## 2020-11-23 NOTE — TELEPHONE ENCOUNTER
Please call, needs some other medication for migraines. What she's taking is not working.  Dr. Roxi Bautista has given her a steroid dose pack that has really helped in the past.

## 2020-11-25 NOTE — TELEPHONE ENCOUNTER
I called the patient and her PCP sent the prednisone pack in for her.  She is doing a bit better and will update me again

## 2020-12-04 ENCOUNTER — TELEPHONE (OUTPATIENT)
Dept: NEUROLOGY | Age: 65
End: 2020-12-04

## 2020-12-04 NOTE — TELEPHONE ENCOUNTER
----- Message from [employer] sent at 12/4/2020  1:29 PM EST -----  Regarding: Dr. Sydney Dallas first and last name:  Tomas Jauregui (Spouse)      Reason for call:  Need medication      Callback required yes/no and why: Y      Best contact number(s): [phone]      Details to clarify the request:  Mr. Ivette Everett is requesting a call back because the patient is currently taking rizatriptan for her migraines, but it is not working. They are looking for something else or something stronger to help her with migraines.       [employer]

## 2020-12-10 NOTE — TELEPHONE ENCOUNTER
I called the patient:  About a week after her recent steroid pack she had another migraine which lasted about 4-5 days    Previous note-  Topamax 100mg twice daily and no longer needed nausea medicine. Not getting many at the time    She thinks it has to do with the weather/sinuses? Maybe look at another type of medication? Her insurance will change in January.  Put her on the schedule once her insurance changes because she states she will be fine until then

## 2021-07-13 ENCOUNTER — OFFICE VISIT (OUTPATIENT)
Dept: NEUROLOGY | Age: 66
End: 2021-07-13
Payer: MEDICARE

## 2021-07-13 VITALS
HEART RATE: 69 BPM | HEIGHT: 67 IN | WEIGHT: 146 LBS | SYSTOLIC BLOOD PRESSURE: 120 MMHG | DIASTOLIC BLOOD PRESSURE: 70 MMHG | BODY MASS INDEX: 22.91 KG/M2 | OXYGEN SATURATION: 98 %

## 2021-07-13 DIAGNOSIS — G51.8 FACIAL NEURALGIA: ICD-10-CM

## 2021-07-13 DIAGNOSIS — E53.8 B12 DEFICIENCY: ICD-10-CM

## 2021-07-13 DIAGNOSIS — E55.9 VITAMIN D DEFICIENCY: ICD-10-CM

## 2021-07-13 DIAGNOSIS — G43.109 MIGRAINE WITH AURA AND WITHOUT STATUS MIGRAINOSUS, NOT INTRACTABLE: ICD-10-CM

## 2021-07-13 DIAGNOSIS — G44.209 TENSION VASCULAR HEADACHE: Primary | ICD-10-CM

## 2021-07-13 PROCEDURE — G8432 DEP SCR NOT DOC, RNG: HCPCS | Performed by: PSYCHIATRY & NEUROLOGY

## 2021-07-13 PROCEDURE — 99214 OFFICE O/P EST MOD 30 MIN: CPT | Performed by: PSYCHIATRY & NEUROLOGY

## 2021-07-13 PROCEDURE — G8400 PT W/DXA NO RESULTS DOC: HCPCS | Performed by: PSYCHIATRY & NEUROLOGY

## 2021-07-13 PROCEDURE — 3017F COLORECTAL CA SCREEN DOC REV: CPT | Performed by: PSYCHIATRY & NEUROLOGY

## 2021-07-13 PROCEDURE — G8536 NO DOC ELDER MAL SCRN: HCPCS | Performed by: PSYCHIATRY & NEUROLOGY

## 2021-07-13 PROCEDURE — G8420 CALC BMI NORM PARAMETERS: HCPCS | Performed by: PSYCHIATRY & NEUROLOGY

## 2021-07-13 PROCEDURE — G8427 DOCREV CUR MEDS BY ELIG CLIN: HCPCS | Performed by: PSYCHIATRY & NEUROLOGY

## 2021-07-13 PROCEDURE — 1101F PT FALLS ASSESS-DOCD LE1/YR: CPT | Performed by: PSYCHIATRY & NEUROLOGY

## 2021-07-13 PROCEDURE — 1090F PRES/ABSN URINE INCON ASSESS: CPT | Performed by: PSYCHIATRY & NEUROLOGY

## 2021-07-13 RX ORDER — SERTRALINE HYDROCHLORIDE 100 MG/1
TABLET, FILM COATED ORAL
Qty: 90 TABLET | Refills: 4 | Status: SHIPPED | OUTPATIENT
Start: 2021-07-13 | End: 2022-08-29

## 2021-07-13 RX ORDER — TOPIRAMATE 100 MG/1
TABLET, FILM COATED ORAL
Qty: 180 TABLET | Refills: 4 | Status: SHIPPED | OUTPATIENT
Start: 2021-07-13 | End: 2022-08-29

## 2021-07-13 NOTE — PATIENT INSTRUCTIONS
Office Policies    o Phone calls/patient messages:  Please allow up to 24 hours for someone in the office to contact you about your call or message. Be mindful your provider may be out of the office or your message may require further review. We encourage you to use [facility] for your messages as this is a faster, more efficient way to communicate with our office    o Medication Refills:  Prescription medications require up to 48 business hours to process. We encourage you to use [facility] for your refills. For controlled medications: Please allow up to 72 business hours to process. Certain medications may require you to  a written prescription at our office. NO narcotic/controlled medications will be prescribed after 4pm Monday through Friday or on weekends    o Form/Paperwork Completion:  We ask that you allow 7-14 business days. You may also download your forms to [facility] to have your doctor print off. Due to COVID-19, please note there may be a longer wait time than usual. Thank you.     If you have questions/concerns regarding your health maintenance, please reach out to your primary care physician

## 2021-07-13 NOTE — LETTER
7/13/2021    Patient: Addy Higgins   YOB: 1955   Date of Visit: 7/13/2021     Gladys Bentley MD  [facility] [address]  [place]    Dear Gladys Bentley MD,      Thank you for referring Ms. Lamonte Marion to [address] [place] [address] for evaluation. My notes for this consultation are attached. Consult    Subjective:     Addy Higgins is a 74 y. o.right-handed  female seen for evaluation at the request of Dr. Kane Rehman of problem of facial pain that occurred last year, and for which she had an MRI of the brain that was unremarkable and an MRA of the brain that was unremarkable, and she was given gabapentin 300 mg 3 times a day, and after several weeks of therapy the pain went away and she went off the medication and has done well since then. She is also seen for her headaches, they seem to be doing better on Topamax 100 mg twice a day, and she rarely has a severe migraine and takes Maxalt as needed, and no longer uses the Phenergan. The headaches were worse in December and January in the winter months, but have gotten markedly better occurring once or twice a week only now, and the Maxalt usually will help. We talked about the new medications of Varchrissie Carbo which may be better suited for her at her age to avoid the vasoconstrictive properties, but she is happy with her therapy and does not want any change. She is doing well on the Zoloft 200 mg a day and Topamax 100 mg twice a day and the Maxalt as needed. These were all refilled for the patient today. She is going to try the new CGRP inhibitors if her headaches get worse again, right now she is comfortable with her current treatment does not really want to change. She understands the risk factors of the triptans at her age. Cautioned her and her  in detail about these but she still wants to maintain her current treatment.   She has never had a side effect of the medication. She is also not exercising complaining of weakness in her legs, we advised that she needs to start exercising half an hour every day now every other day mild exercise, otherwise she will continue to get weaker in her legs and lose her ability to ambulate as well and lose her balance some, and she is encouraged to do both stretching and balance exercises, strengthening exercises with light weights and machines, and cardiovascular sizes as far as walking, treadmill, exercise cycle etc.  We also explained to her that exercise will help lessen her chance of Alzheimer's disease 2. She is already taking a B complex multivitamin. Her headaches are doing fair, and she had a switch to the Maxalt because of insurance issues, and that is working on the headaches but not as good as the Zomig did, but she is managing. Her headaches usually occur over mainly in the frontal and sinus area associated with marked allergies and sinus congestion that occur several times a month. Her allergies have gotten somewhat better over the last few weeks, and the headaches are better and back to her normal baseline now. Her allergist retired and she does not know who to go to I suggested she see another physician in their group. She had no new focal weakness, sensory loss, fever, meningismus, or other causes of her headaches. Her  came with her and he is very concerned about her. She denies any increased stress tension or anxiety in addition. She is retired now and should be more relaxed. She does not get the aura anymore I told her that is exactly the opposite of what should happen, at her age she should be getting less headaches and more aura. Patient last seen one year ago. Patient has a long-standing history of headaches, and has occasional exacerbation that requires Medrol Dosepak to break her headache cycle.  Her headaches are more bitemporal, throbbing in nature associated with nausea and vomiting and occur once or twice a month, and occasionally more often. Stress and tension are precipitating factors. 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 A complete review of systems in symptoms was negative for any new medical problems complications and illnesses other than mild anxiety, depression, skin cancer, and respiratory infections. Past Medical History:   Diagnosis Date    Headache     Headache(784.0)     Neurological disorder       No past surgical history on file. Family History   Problem Relation Age of Onset    Dementia Mother     Diabetes Mother     Heart Disease Father     Other Father         pad    Diabetes Father     Diabetes Brother       Social History     Tobacco Use    Smoking status: Never Smoker    Smokeless tobacco: Never Used   Substance Use Topics    Alcohol use: No       Current Outpatient Medications   Medication Sig Dispense Refill    sertraline (ZOLOFT) 100 mg tablet TAKE 1 TABLET BY MOUTH EVERY DAY 90 Tablet 4    topiramate (TOPAMAX) 100 mg tablet TAKE 1 TAB BY MOUTH TWO (2) TIMES A DAY. 180 Tablet 4    rizatriptan (MAXALT-MLT) 10 mg disintegrating tablet Take 1 Tab by mouth every twelve (12) hours as needed for Migraine. 12 Tab 11    montelukast (SINGULAIR) 10 mg tablet TAKE 1 TABLET BY MOUTH EVERY DAY      triamcinolone (NASACORT AQ) 55 mcg nasal inhaler 2 Sprays as needed.  magnesium oxide (MAG-OX) 400 mg tablet Take 400 mg by mouth daily.  co-enzyme Q-10 (CO Q-10) 100 mg capsule Take 300 mg by mouth daily.  Cholecalciferol, Vitamin D3, (VITAMIN D3) 1,000 unit cap Take  by mouth daily.  multivitamins-minerals-lutein (CENTRUM SILVER) Tab Take  by mouth.           Allergies   Allergen Reactions    Reglan [Metoclopramide] Anaphylaxis    Bactrim [Sulfamethoprim Ds] Rash    Pcn [Penicillins] Other (comments)        Review of Systems:  A comprehensive review of systems was negative except for: Neurological: positive for headaches     Objective:     I      NEUROLOGICAL EXAM:    Appearance: The patient is thinly developed and nourished, provides a coherent history and is in no acute distress. Mental Status: Oriented to time, place and person and the president. Speech is fluent without aphasia, and cognitive function and fund of knowledge normal for age. Mood and affect mildly depressed. Cranial Nerves:   Intact visual fields. Fundi are benign, disc are flat, no lesions seen on funduscopy. MAGI, EOM's full, no nystagmus, no ptosis. Facial sensation is normal. Corneal reflexes are not tested. Facial movement is symmetric. Hearing is normal bilaterally. Palate is midline with normal sternocleidomastoid and trapezius muscles are normal. Tongue is midline. Neck is supple without meningismus or bruits  Temporal arteries not tender or enlarged   Patient is tenderness on palpation over the supraorbital nerve on the right side   Motor:  5/5 strength in upper and lower proximal and distal muscles. Normal bulk and tone. No fasciculations. Rapid alternating movement is symmetric   Reflexes:   Deep tendon reflexes 2+/4 and symmetrical. No Babinski or clonus present   Sensory:   Normal to touch, pinprick and vibration and temperature and DSS. Gait:  Normal gait. Tremor:   No tremor noted. Cerebellar:  No cerebellar signs present on Romberg, tandem, and finger-nose-finger exam.   Neurovascular:  Normal heart sounds and regular rhythm, peripheral pulses decreased in both feet and no carotid bruits.            Assessment:         Plan:     Patient with new problem of continuing Maxalt at her age because of her cardiovascular risk factors and we tried to convince her to switch over to [place] and [place] one of the new CGRP inhibitors, she still would prefer to stay on her current treatment she is tolerating it well, it is affordable, and she has she is doing better now than she has in quite some time the headaches markedly improved since the winter but will consider other therapies in the future if the headaches do come back. Patient's facial pain is doing much better with previous treatment, and he no longer needs the medication of Neurontin, and she had a normal MRI of the brain and MRA of the brain and evaluation of that last year. Migraine seem to be well controlled on Topamax 100 mg twice a day and her Zoloft 200 mg once a day and her as needed Maxalt she will continue those   I reassured her that interactions with the triptan's or Zoloft are rare so she should be safe to continue her current medications which are providing adequate relief of her headaches. Recommended she see her allergy group and see another physician since her allergist has retired. Her medications are refilled for the patient today, her condition discussed with the patient in detail  Patient is to continue present medications. Patient was advised to remain mentally and physically active, exercising, take her vitamins and vitamin D and medications as prescribed. Refills given today for her medications  Patient to be seen again in 12 months time or earlier if needed, and patient will call if any problems in the interim. Signed By: Chacha Siddiqui MD     July 13, 2021                       If you have questions, please do not hesitate to call me. I look forward to following your patient along with you.       Sincerely,    Chacha Siddiqui MD

## 2021-07-14 NOTE — PROGRESS NOTES
Consult    Subjective:     Diane Hernandes is a 74 y. o.right-handed  female seen for evaluation at the request of Dr. Greg Hoover of problem of facial pain that occurred last year, and for which she had an MRI of the brain that was unremarkable and an MRA of the brain that was unremarkable, and she was given gabapentin 300 mg 3 times a day, and after several weeks of therapy the pain went away and she went off the medication and has done well since then. She is also seen for her headaches, they seem to be doing better on Topamax 100 mg twice a day, and she rarely has a severe migraine and takes Maxalt as needed, and no longer uses the Phenergan. The headaches were worse in December and January in the winter months, but have gotten markedly better occurring once or twice a week only now, and the Maxalt usually will help. We talked about the new medications of [place] which may be better suited for her at her age to avoid the vasoconstrictive properties, but she is happy with her therapy and does not want any change. She is doing well on the Zoloft 200 mg a day and Topamax 100 mg twice a day and the Maxalt as needed. These were all refilled for the patient today. She is going to try the new CGRP inhibitors if her headaches get worse again, right now she is comfortable with her current treatment does not really want to change. She understands the risk factors of the triptans at her age. Cautioned her and her  in detail about these but she still wants to maintain her current treatment. She has never had a side effect of the medication.   She is also not exercising complaining of weakness in her legs, we advised that she needs to start exercising half an hour every day now every other day mild exercise, otherwise she will continue to get weaker in her legs and lose her ability to ambulate as well and lose her balance some, and she is encouraged to do both stretching and balance exercises, strengthening exercises with light weights and machines, and cardiovascular sizes as far as walking, treadmill, exercise cycle etc.  We also explained to her that exercise will help lessen her chance of Alzheimer's disease 2. She is already taking a B complex multivitamin. Her headaches are doing fair, and she had a switch to the [employer] because of insurance issues, and that is working on the headaches but not as good as the Zomig did, but she is managing. Her headaches usually occur over mainly in the frontal and sinus area associated with marked allergies and sinus congestion that occur several times a month. Her allergies have gotten somewhat better over the last few weeks, and the headaches are better and back to her normal baseline now. Her allergist retired and she does not know who to go to I suggested she see another physician in their group. She had no new focal weakness, sensory loss, fever, meningismus, or other causes of her headaches. Her  came with her and he is very concerned about her. She denies any increased stress tension or anxiety in addition. She is retired now and should be more relaxed. She does not get the aura anymore I told her that is exactly the opposite of what should happen, at her age she should be getting less headaches and more aura. Patient last seen one year ago. Patient has a long-standing history of headaches, and has occasional exacerbation that requires Medrol Dosepak to break her headache cycle. Her headaches are more bitemporal, throbbing in nature associated with nausea and vomiting and occur once or twice a month, and occasionally more often. Stress and tension are precipitating factors. 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     A complete review of systems in symptoms was negative for any new medical problems complications and illnesses other than mild anxiety, depression, skin cancer, and respiratory infections. Past Medical History:   Diagnosis Date    Headache     Headache(784.0)     Neurological disorder       No past surgical history on file. Family History   Problem Relation Age of Onset    Dementia Mother     Diabetes Mother     Heart Disease Father     Other Father         pad    Diabetes Father     Diabetes Brother       Social History     Tobacco Use    Smoking status: Never Smoker    Smokeless tobacco: Never Used   Substance Use Topics    Alcohol use: No       Current Outpatient Medications   Medication Sig Dispense Refill    sertraline (ZOLOFT) 100 mg tablet TAKE 1 TABLET BY MOUTH EVERY DAY 90 Tablet 4    topiramate (TOPAMAX) 100 mg tablet TAKE 1 TAB BY MOUTH TWO (2) TIMES A DAY. 180 Tablet 4    rizatriptan (MAXALT-MLT) 10 mg disintegrating tablet Take 1 Tab by mouth every twelve (12) hours as needed for Migraine. 12 Tab 11    montelukast (SINGULAIR) 10 mg tablet TAKE 1 TABLET BY MOUTH EVERY DAY      triamcinolone (NASACORT AQ) 55 mcg nasal inhaler 2 Sprays as needed.  magnesium oxide (MAG-OX) 400 mg tablet Take 400 mg by mouth daily.  co-enzyme Q-10 (CO Q-10) 100 mg capsule Take 300 mg by mouth daily.  Cholecalciferol, Vitamin D3, (VITAMIN D3) 1,000 unit cap Take  by mouth daily.  multivitamins-minerals-lutein (CENTRUM SILVER) Tab Take  by mouth. Allergies   Allergen Reactions    Reglan [Metoclopramide] Anaphylaxis    Bactrim [Sulfamethoprim Ds] Rash    Pcn [Penicillins] Other (comments)        Review of Systems:  A comprehensive review of systems was negative except for: Neurological: positive for headaches     Objective:     I      NEUROLOGICAL EXAM:    Appearance: The patient is thinly developed and nourished, provides a coherent history and is in no acute distress. Mental Status: Oriented to time, place and person and the president. Speech is fluent without aphasia, and cognitive function and fund of knowledge normal for age. Mood and affect mildly depressed. Cranial Nerves:   Intact visual fields. Fundi are benign, disc are flat, no lesions seen on funduscopy. MAGI, EOM's full, no nystagmus, no ptosis. Facial sensation is normal. Corneal reflexes are not tested. Facial movement is symmetric. Hearing is normal bilaterally. Palate is midline with normal sternocleidomastoid and trapezius muscles are normal. Tongue is midline. Neck is supple without meningismus or bruits  Temporal arteries not tender or enlarged   Patient is tenderness on palpation over the supraorbital nerve on the right side   Motor:  5/5 strength in upper and lower proximal and distal muscles. Normal bulk and tone. No fasciculations. Rapid alternating movement is symmetric   Reflexes:   Deep tendon reflexes 2+/4 and symmetrical. No Babinski or clonus present   Sensory:   Normal to touch, pinprick and vibration and temperature and DSS. Gait:  Normal gait. Tremor:   No tremor noted. Cerebellar:  No cerebellar signs present on Romberg, tandem, and finger-nose-finger exam.   Neurovascular:  Normal heart sounds and regular rhythm, peripheral pulses decreased in both feet and no carotid bruits. Assessment:         Plan:     Patient with new problem of continuing Maxalt at her age because of her cardiovascular risk factors and we tried to convince her to switch over to [place] one of the new CGRP inhibitors, she still would prefer to stay on her current treatment she is tolerating it well, it is affordable, and she has she is doing better now than she has in quite some time the headaches markedly improved since the winter but will consider other therapies in the future if the headaches do come back. Patient's facial pain is doing much better with previous treatment, and he no longer needs the medication of Neurontin, and she had a normal MRI of the brain and MRA of the brain and evaluation of that last year.   Migraine seem to be well controlled on Topamax 100 mg twice a day and her Zoloft 200 mg once a day and her as needed Maxalt she will continue those   I reassured her that interactions with the triptan's or Zoloft are rare so she should be safe to continue her current medications which are providing adequate relief of her headaches. Recommended she see her allergy group and see another physician since her allergist has retired. Her medications are refilled for the patient today, her condition discussed with the patient in detail  Patient is to continue present medications. Patient was advised to remain mentally and physically active, exercising, take her vitamins and vitamin D and medications as prescribed. Refills given today for her medications  Patient to be seen again in 12 months time or earlier if needed, and patient will call if any problems in the interim.     Signed By: Lizett Aguayo MD     July 13, 2021

## 2022-01-19 ENCOUNTER — OFFICE VISIT (OUTPATIENT)
Dept: NEUROLOGY | Age: 67
End: 2022-01-19
Payer: MEDICARE

## 2022-01-19 VITALS
BODY MASS INDEX: 2.35 KG/M2 | SYSTOLIC BLOOD PRESSURE: 120 MMHG | WEIGHT: 15 LBS | DIASTOLIC BLOOD PRESSURE: 72 MMHG | HEART RATE: 86 BPM | TEMPERATURE: 97.4 F | OXYGEN SATURATION: 98 % | RESPIRATION RATE: 18 BRPM | HEIGHT: 67 IN

## 2022-01-19 DIAGNOSIS — E55.9 VITAMIN D DEFICIENCY: ICD-10-CM

## 2022-01-19 DIAGNOSIS — E53.8 B12 DEFICIENCY: ICD-10-CM

## 2022-01-19 DIAGNOSIS — G44.209 TENSION VASCULAR HEADACHE: ICD-10-CM

## 2022-01-19 DIAGNOSIS — G51.8 FACIAL NEURALGIA: ICD-10-CM

## 2022-01-19 DIAGNOSIS — G43.109 MIGRAINE WITH AURA AND WITHOUT STATUS MIGRAINOSUS, NOT INTRACTABLE: Primary | ICD-10-CM

## 2022-01-19 PROCEDURE — G8427 DOCREV CUR MEDS BY ELIG CLIN: HCPCS | Performed by: PSYCHIATRY & NEUROLOGY

## 2022-01-19 PROCEDURE — G8432 DEP SCR NOT DOC, RNG: HCPCS | Performed by: PSYCHIATRY & NEUROLOGY

## 2022-01-19 PROCEDURE — G8536 NO DOC ELDER MAL SCRN: HCPCS | Performed by: PSYCHIATRY & NEUROLOGY

## 2022-01-19 PROCEDURE — 3017F COLORECTAL CA SCREEN DOC REV: CPT | Performed by: PSYCHIATRY & NEUROLOGY

## 2022-01-19 PROCEDURE — 99213 OFFICE O/P EST LOW 20 MIN: CPT | Performed by: PSYCHIATRY & NEUROLOGY

## 2022-01-19 PROCEDURE — G8419 CALC BMI OUT NRM PARAM NOF/U: HCPCS | Performed by: PSYCHIATRY & NEUROLOGY

## 2022-01-19 PROCEDURE — 1090F PRES/ABSN URINE INCON ASSESS: CPT | Performed by: PSYCHIATRY & NEUROLOGY

## 2022-01-19 PROCEDURE — G8400 PT W/DXA NO RESULTS DOC: HCPCS | Performed by: PSYCHIATRY & NEUROLOGY

## 2022-01-19 PROCEDURE — 1101F PT FALLS ASSESS-DOCD LE1/YR: CPT | Performed by: PSYCHIATRY & NEUROLOGY

## 2022-01-19 RX ORDER — RIZATRIPTAN BENZOATE 10 MG/1
10 TABLET, ORALLY DISINTEGRATING ORAL
Qty: 36 TABLET | Refills: 4 | Status: SHIPPED | OUTPATIENT
Start: 2022-01-19 | End: 2022-09-06

## 2022-01-19 NOTE — LETTER
1/19/2022    Patient: Abel Amaro   YOB: 1955   Date of Visit: 1/19/2022     Chayo Taylor MD  [facility] [address]  [place]    Dear Chayo Taylor MD,      Thank you for referring Ms. Pepe Marion to [address] [place] [address] for evaluation. My notes for this consultation are attached. Consult    Subjective:     Abel Amaro is a 74 y. o.right-handed  female seen for evaluation at the request of Dr. Don Rodriguez of problem of facial pain tension migraine headaches for which she takes Maxalt on a as needed basis, and still wants to continue that medication despite the increased cardiovascular risk factors at her age. The facial pain resolved spontaneously, and has not reoccurred so far this year. The headaches may be a little less frequent and a little less severe also. She wants to continue her current medications and not change anything. She had no other new medical or neurological problem other than the fall last spring when she fractured her left hip. She has had no more falls. For her facial pain patient had an MRI of the brain that was unremarkable last year and an MRA of the brain that was unremarkable, and she was given gabapentin 300 mg 3 times a day, and after several weeks of therapy the pain went away and she went off the medication and has done well since then. She is also seen for her headaches, they seem to be doing better on Topamax 100 mg twice a day, and she rarely has a severe migraine and takes Maxalt as needed, and no longer uses the Phenergan. The headaches were worse in December and January in the winter months, but have gotten markedly better occurring once or twice a week only now, and the Maxalt usually will help.   We talked about the new medications of Margy Yoon which may be better suited for her at her age to avoid the vasoconstrictive properties, but she is happy with her therapy and does not want any change. She is doing well on the Zoloft 200 mg a day and Topamax 100 mg twice a day and the Maxalt as needed. These were all refilled for the patient today. She is going to try the new CGRP inhibitors if her headaches get worse again, right now she is comfortable with her current treatment does not really want to change. She understands the risk factors of the triptans at her age. She has never had a side effect of the medication. We advised that she needs to start exercising half an hour every day now every other day mild exercise, otherwise she will continue to get weaker in her legs and lose her ability to ambulate as well and lose her balance some, and she is encouraged to do both stretching and balance exercises, strengthening exercises with light weights and machines, and cardiovascular sizes as far as walking, treadmill, exercise cycle etc.  We also explained to her that exercise will help lessen her chance of Alzheimer's disease 2. She is already taking a B complex multivitamin. Her headaches are doing fair, and she had a switch to the Maxalt because of insurance issues, and that is working on the headaches but not as good as the Zomig did, but she is managing. Her headaches usually occur over mainly in the frontal and sinus area associated with marked allergies and sinus congestion that occur several times a month. Her allergies have gotten somewhat better over the last few weeks, and the headaches are better and back to her normal baseline now. Her allergist retired and she does not know who to go to I suggested she see another physician in their group. She had no new focal weakness, sensory loss, fever, meningismus, or other causes of her headaches. Her  came with her and he is very concerned about her. She denies any increased stress tension or anxiety in addition. She is retired now and should be more relaxed.   She does not get the aura anymore I told her that is exactly the opposite of what should happen, at her age she should be getting less headaches and more aura. Patient last seen one year ago. Patient has a long-standing history of headaches, and has occasional exacerbation that requires Medrol Dosepak to break her headache cycle. Her headaches are more bitemporal, throbbing in nature associated with nausea and vomiting and occur once or twice a month, and occasionally more often. Stress and tension are precipitating factors. 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 A complete review of systems in symptoms was negative for any new medical problems complications and illnesses other than mild anxiety, depression, skin cancer, and respiratory infections. Past Medical History:   Diagnosis Date    Headache     Headache(784.0)     Neurological disorder       No past surgical history on file. Family History   Problem Relation Age of Onset    Dementia Mother     Diabetes Mother     Heart Disease Father     Other Father         pad    Diabetes Father     Diabetes Brother       Social History     Tobacco Use    Smoking status: Never Smoker    Smokeless tobacco: Never Used   Substance Use Topics    Alcohol use: No       Current Outpatient Medications   Medication Sig Dispense Refill    rizatriptan (MAXALT-MLT) 10 mg disintegrating tablet Take 1 Tablet by mouth every twelve (12) hours as needed for Migraine. 36 Tablet 4    sertraline (ZOLOFT) 100 mg tablet TAKE 1 TABLET BY MOUTH EVERY DAY 90 Tablet 4    topiramate (TOPAMAX) 100 mg tablet TAKE 1 TAB BY MOUTH TWO (2) TIMES A DAY. 180 Tablet 4    montelukast (SINGULAIR) 10 mg tablet TAKE 1 TABLET BY MOUTH EVERY DAY      triamcinolone (NASACORT AQ) 55 mcg nasal inhaler 2 Sprays as needed.  magnesium oxide (MAG-OX) 400 mg tablet Take 400 mg by mouth daily.  co-enzyme Q-10 (CO Q-10) 100 mg capsule Take 300 mg by mouth daily.       Cholecalciferol, Vitamin D3, (VITAMIN D3) 1,000 unit cap Take  by mouth daily.  multivitamins-minerals-lutein (CENTRUM SILVER) Tab Take  by mouth. Allergies   Allergen Reactions    Reglan [Metoclopramide] Anaphylaxis    Bactrim [Sulfamethoprim Ds] Rash    Pcn [Penicillins] Other (comments)        Review of Systems:  A comprehensive review of systems was negative except for: Neurological: positive for headaches     Objective:     I      NEUROLOGICAL EXAM:    Appearance: The patient is thinly developed and nourished, provides a coherent history and is in no acute distress. Mental Status: Oriented to time, place and person and the president. Speech is fluent without aphasia, and cognitive function and fund of knowledge normal for age. Mood and affect mildly depressed. Cranial Nerves:   Intact visual fields. Fundi are benign, disc are flat, no lesions seen on funduscopy. MAGI, EOM's full, no nystagmus, no ptosis. Facial sensation is normal. Corneal reflexes are not tested. Facial movement is symmetric. Hearing is normal bilaterally. Palate is midline with normal sternocleidomastoid and trapezius muscles are normal. Tongue is midline. Neck is supple without meningismus or bruits  Temporal arteries not tender or enlarged   Patient is tenderness on palpation over the supraorbital nerve on the right side   Motor:  5/5 strength in upper and lower proximal and distal muscles. Normal bulk and tone. No fasciculations. Rapid alternating movement is symmetric   Reflexes:   Deep tendon reflexes 2+/4 and symmetrical. No Babinski or clonus present   Sensory:   Normal to touch, pinprick and vibration and temperature and DSS. Gait:  Normal gait. Tremor:   No tremor noted. Cerebellar:  No cerebellar signs present on Romberg, tandem, and finger-nose-finger exam.   Neurovascular:  Normal heart sounds and regular rhythm, peripheral pulses decreased in both feet and no carotid bruits.            Assessment:         Plan:     Patient with new problem of continuing Maxalt at her age because of her cardiovascular risk factors and we tried to convince her to switch over to Juanna Cram one of the new CGRP inhibitors, she still would prefer to stay on her current treatment she is tolerating it well, it is affordable, and she has she is doing better now than she has in quite some time the headaches markedly improved since the winter but will consider other therapies in the future if the headaches do come back. Patient's facial pain is doing much better with previous treatment, and he no longer needs the medication of Neurontin, and she had a normal MRI of the brain and MRA of the brain and evaluation of that last year. Migraine seem to be well controlled on Topamax 100 mg twice a day and her Zoloft 200 mg once a day and her as needed Maxalt she will continue those   I reassured her that interactions with the triptan's or Zoloft are rare so she should be safe to continue her current medications which are providing adequate relief of her headaches. Recommended she see her allergy group and see another physician since her allergist has retired. Her medications are refilled for the patient today, her condition discussed with the patient in detail  Patient is to continue present medications. Patient was advised to remain mentally and physically active, exercising, take her vitamins and vitamin D and medications as prescribed. Refills given today for her medications  28-minute spent with the patient, going over her exam, discussing therapeutic options and treatment modalities with her in detail. Patient to be seen again in 12 months time or earlier if needed, and patient will call if any problems in the interim. Signed By: David Costa MD     January 19, 2022                     If you have questions, please do not hesitate to call me. I look forward to following your patient along with you.       Sincerely,    David Costa MD

## 2022-01-19 NOTE — PROGRESS NOTES
Consult    Subjective:     Washington Hudson is a 74 y. o.right-handed  female seen for evaluation at the request of Dr. Lesly Abraham of problem of facial pain tension migraine headaches for which she takes Maxalt on a as needed basis, and still wants to continue that medication despite the increased cardiovascular risk factors at her age. The facial pain resolved spontaneously, and has not reoccurred so far this year. The headaches may be a little less frequent and a little less severe also. She wants to continue her current medications and not change anything. She had no other new medical or neurological problem other than the fall last spring when she fractured her left hip. She has had no more falls. For her facial pain patient had an MRI of the brain that was unremarkable last year and an MRA of the brain that was unremarkable, and she was given gabapentin 300 mg 3 times a day, and after several weeks of therapy the pain went away and she went off the medication and has done well since then. She is also seen for her headaches, they seem to be doing better on Topamax 100 mg twice a day, and she rarely has a severe migraine and takes Maxalt as needed, and no longer uses the Phenergan. The headaches were worse in December and January in the winter months, but have gotten markedly better occurring once or twice a week only now, and the Maxalt usually will help. We talked about the new medications of Barry Daleks which may be better suited for her at her age to avoid the vasoconstrictive properties, but she is happy with her therapy and does not want any change. She is doing well on the Zoloft 200 mg a day and Topamax 100 mg twice a day and the Maxalt as needed. These were all refilled for the patient today. She is going to try the new CGRP inhibitors if her headaches get worse again, right now she is comfortable with her current treatment does not really want to change.   She understands the risk factors of the triptans at her age. She has never had a side effect of the medication. We advised that she needs to start exercising half an hour every day now every other day mild exercise, otherwise she will continue to get weaker in her legs and lose her ability to ambulate as well and lose her balance some, and she is encouraged to do both stretching and balance exercises, strengthening exercises with light weights and machines, and cardiovascular sizes as far as walking, treadmill, exercise cycle etc.  We also explained to her that exercise will help lessen her chance of Alzheimer's disease 2. She is already taking a B complex multivitamin. Her headaches are doing fair, and she had a switch to the Maxalt because of insurance issues, and that is working on the headaches but not as good as the Zomig did, but she is managing. Her headaches usually occur over mainly in the frontal and sinus area associated with marked allergies and sinus congestion that occur several times a month. Her allergies have gotten somewhat better over the last few weeks, and the headaches are better and back to her normal baseline now. Her allergist retired and she does not know who to go to I suggested she see another physician in their group. She had no new focal weakness, sensory loss, fever, meningismus, or other causes of her headaches. Her  came with her and he is very concerned about her. She denies any increased stress tension or anxiety in addition. She is retired now and should be more relaxed. She does not get the aura anymore I told her that is exactly the opposite of what should happen, at her age she should be getting less headaches and more aura. Patient last seen one year ago. Patient has a long-standing history of headaches, and has occasional exacerbation that requires Medrol Dosepak to break her headache cycle.  Her headaches are more bitemporal, throbbing in nature associated with nausea and vomiting and occur once or twice a month, and occasionally more often. Stress and tension are precipitating factors. 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 A complete review of systems in symptoms was negative for any new medical problems complications and illnesses other than mild anxiety, depression, skin cancer, and respiratory infections. Past Medical History:   Diagnosis Date    Headache     Headache(784.0)     Neurological disorder       No past surgical history on file. Family History   Problem Relation Age of Onset    Dementia Mother     Diabetes Mother     Heart Disease Father     Other Father         pad    Diabetes Father     Diabetes Brother       Social History     Tobacco Use    Smoking status: Never Smoker    Smokeless tobacco: Never Used   Substance Use Topics    Alcohol use: No       Current Outpatient Medications   Medication Sig Dispense Refill    rizatriptan (MAXALT-MLT) 10 mg disintegrating tablet Take 1 Tablet by mouth every twelve (12) hours as needed for Migraine. 36 Tablet 4    sertraline (ZOLOFT) 100 mg tablet TAKE 1 TABLET BY MOUTH EVERY DAY 90 Tablet 4    topiramate (TOPAMAX) 100 mg tablet TAKE 1 TAB BY MOUTH TWO (2) TIMES A DAY. 180 Tablet 4    montelukast (SINGULAIR) 10 mg tablet TAKE 1 TABLET BY MOUTH EVERY DAY      triamcinolone (NASACORT AQ) 55 mcg nasal inhaler 2 Sprays as needed.  magnesium oxide (MAG-OX) 400 mg tablet Take 400 mg by mouth daily.  co-enzyme Q-10 (CO Q-10) 100 mg capsule Take 300 mg by mouth daily.  Cholecalciferol, Vitamin D3, (VITAMIN D3) 1,000 unit cap Take  by mouth daily.  multivitamins-minerals-lutein (CENTRUM SILVER) Tab Take  by mouth.           Allergies   Allergen Reactions    Reglan [Metoclopramide] Anaphylaxis    Bactrim [Sulfamethoprim Ds] Rash    Pcn [Penicillins] Other (comments)        Review of Systems:  A comprehensive review of systems was negative except for: Neurological: positive for headaches     Objective:     I      NEUROLOGICAL EXAM:    Appearance: The patient is thinly developed and nourished, provides a coherent history and is in no acute distress. Mental Status: Oriented to time, place and person and the president. Speech is fluent without aphasia, and cognitive function and fund of knowledge normal for age. Mood and affect mildly depressed. Cranial Nerves:   Intact visual fields. Fundi are benign, disc are flat, no lesions seen on funduscopy. MAGI, EOM's full, no nystagmus, no ptosis. Facial sensation is normal. Corneal reflexes are not tested. Facial movement is symmetric. Hearing is normal bilaterally. Palate is midline with normal sternocleidomastoid and trapezius muscles are normal. Tongue is midline. Neck is supple without meningismus or bruits  Temporal arteries not tender or enlarged   Patient is tenderness on palpation over the supraorbital nerve on the right side   Motor:  5/5 strength in upper and lower proximal and distal muscles. Normal bulk and tone. No fasciculations. Rapid alternating movement is symmetric   Reflexes:   Deep tendon reflexes 2+/4 and symmetrical. No Babinski or clonus present   Sensory:   Normal to touch, pinprick and vibration and temperature and DSS. Gait:  Normal gait. Tremor:   No tremor noted. Cerebellar:  No cerebellar signs present on Romberg, tandem, and finger-nose-finger exam.   Neurovascular:  Normal heart sounds and regular rhythm, peripheral pulses decreased in both feet and no carotid bruits.            Assessment:         Plan:     Patient with new problem of continuing Maxalt at her age because of her cardiovascular risk factors and we tried to convince her to switch over to [place] one of the new CGRP inhibitors, she still would prefer to stay on her current treatment she is tolerating it well, it is affordable, and she has she is doing better now than she has in quite some time the headaches markedly improved since the winter but will consider other therapies in the future if the headaches do come back. Patient's facial pain is doing much better with previous treatment, and he no longer needs the medication of Neurontin, and she had a normal MRI of the brain and MRA of the brain and evaluation of that last year. Migraine seem to be well controlled on Topamax 100 mg twice a day and her Zoloft 200 mg once a day and her as needed Maxalt she will continue those   I reassured her that interactions with the triptan's or Zoloft are rare so she should be safe to continue her current medications which are providing adequate relief of her headaches. Recommended she see her allergy group and see another physician since her allergist has retired. Her medications are refilled for the patient today, her condition discussed with the patient in detail  Patient is to continue present medications. Patient was advised to remain mentally and physically active, exercising, take her vitamins and vitamin D and medications as prescribed. Refills given today for her medications  28-minute spent with the patient, going over her exam, discussing therapeutic options and treatment modalities with her in detail. Patient to be seen again in 12 months time or earlier if needed, and patient will call if any problems in the interim.     Signed By: Rosalia Almanzar MD     January 19, 2022

## 2022-03-18 PROBLEM — E53.8 B12 DEFICIENCY: Status: ACTIVE | Noted: 2019-03-25

## 2022-03-19 PROBLEM — E55.9 VITAMIN D DEFICIENCY: Status: ACTIVE | Noted: 2019-03-25

## 2022-03-19 PROBLEM — G51.8 FACIAL NEURALGIA: Status: ACTIVE | Noted: 2019-07-05

## 2022-03-19 PROBLEM — G44.209 TENSION VASCULAR HEADACHE: Status: ACTIVE | Noted: 2020-09-08

## 2022-08-29 DIAGNOSIS — G51.8 FACIAL NEURALGIA: ICD-10-CM

## 2022-08-29 DIAGNOSIS — G43.109 MIGRAINE WITH AURA AND WITHOUT STATUS MIGRAINOSUS, NOT INTRACTABLE: ICD-10-CM

## 2022-08-29 DIAGNOSIS — E53.8 B12 DEFICIENCY: ICD-10-CM

## 2022-08-29 DIAGNOSIS — E55.9 VITAMIN D DEFICIENCY: ICD-10-CM

## 2022-08-29 DIAGNOSIS — G44.209 TENSION VASCULAR HEADACHE: ICD-10-CM

## 2022-08-29 RX ORDER — SERTRALINE HYDROCHLORIDE 100 MG/1
TABLET, FILM COATED ORAL
Qty: 90 TABLET | Refills: 4 | Status: SHIPPED | OUTPATIENT
Start: 2022-08-29

## 2022-08-29 RX ORDER — TOPIRAMATE 100 MG/1
TABLET, FILM COATED ORAL
Qty: 180 TABLET | Refills: 4 | Status: SHIPPED | OUTPATIENT
Start: 2022-08-29

## 2022-09-06 ENCOUNTER — OFFICE VISIT (OUTPATIENT)
Dept: NEUROLOGY | Age: 67
End: 2022-09-06
Payer: MEDICARE

## 2022-09-06 VITALS
BODY MASS INDEX: 23.23 KG/M2 | RESPIRATION RATE: 18 BRPM | WEIGHT: 148 LBS | OXYGEN SATURATION: 98 % | SYSTOLIC BLOOD PRESSURE: 138 MMHG | HEART RATE: 77 BPM | DIASTOLIC BLOOD PRESSURE: 80 MMHG | HEIGHT: 67 IN | TEMPERATURE: 97.6 F

## 2022-09-06 DIAGNOSIS — G44.209 TENSION VASCULAR HEADACHE: Primary | ICD-10-CM

## 2022-09-06 DIAGNOSIS — G51.8 FACIAL NEURALGIA: ICD-10-CM

## 2022-09-06 DIAGNOSIS — E55.9 VITAMIN D DEFICIENCY: ICD-10-CM

## 2022-09-06 DIAGNOSIS — E53.8 B12 DEFICIENCY: ICD-10-CM

## 2022-09-06 DIAGNOSIS — G43.109 MIGRAINE WITH AURA AND WITHOUT STATUS MIGRAINOSUS, NOT INTRACTABLE: ICD-10-CM

## 2022-09-06 PROCEDURE — 99214 OFFICE O/P EST MOD 30 MIN: CPT | Performed by: PSYCHIATRY & NEUROLOGY

## 2022-09-06 PROCEDURE — G8427 DOCREV CUR MEDS BY ELIG CLIN: HCPCS | Performed by: PSYCHIATRY & NEUROLOGY

## 2022-09-06 PROCEDURE — G8536 NO DOC ELDER MAL SCRN: HCPCS | Performed by: PSYCHIATRY & NEUROLOGY

## 2022-09-06 PROCEDURE — G8420 CALC BMI NORM PARAMETERS: HCPCS | Performed by: PSYCHIATRY & NEUROLOGY

## 2022-09-06 PROCEDURE — 1123F ACP DISCUSS/DSCN MKR DOCD: CPT | Performed by: PSYCHIATRY & NEUROLOGY

## 2022-09-06 PROCEDURE — 1090F PRES/ABSN URINE INCON ASSESS: CPT | Performed by: PSYCHIATRY & NEUROLOGY

## 2022-09-06 PROCEDURE — 1101F PT FALLS ASSESS-DOCD LE1/YR: CPT | Performed by: PSYCHIATRY & NEUROLOGY

## 2022-09-06 PROCEDURE — G8400 PT W/DXA NO RESULTS DOC: HCPCS | Performed by: PSYCHIATRY & NEUROLOGY

## 2022-09-06 PROCEDURE — G8510 SCR DEP NEG, NO PLAN REQD: HCPCS | Performed by: PSYCHIATRY & NEUROLOGY

## 2022-09-06 PROCEDURE — 3017F COLORECTAL CA SCREEN DOC REV: CPT | Performed by: PSYCHIATRY & NEUROLOGY

## 2022-09-06 RX ORDER — RIZATRIPTAN BENZOATE 10 MG/1
10 TABLET, ORALLY DISINTEGRATING ORAL
Qty: 12 TABLET | Refills: 11
Start: 2022-09-06

## 2022-09-06 NOTE — PROGRESS NOTES
Chief Complaint   Patient presents with    Migraine     Follow up  doing the same -\"when weather changes\"- much better with topamax     Depression      sleeps til 4-5 pm and feels she does not hae a life and concerned about wt gain      1. Have you been to the ER, urgent care clinic since your last visit? Hospitalized since your last visit? No     2. Have you seen or consulted any other health care providers outside of the [address] [place] [address] since your last visit? Include any pap smears or colon screening.   No

## 2022-09-06 NOTE — LETTER
9/6/2022    Patient: Regina Short   YOB: 1955   Date of Visit: 9/6/2022     Daniel Wilson MD  [address] [place] [address] [address] [facility]  [address] [address] [facility] [phone]  Via Fax: [phone]    Dear Daniel Wilson MD,      Thank you for referring Ms. Martínez Marion to [address] [place] [address] for evaluation. My notes for this consultation are attached. Chief Complaint   Patient presents with    Migraine     Follow up  doing the same -\"when weather changes\"- much better with topamax     Depression      sleeps til 4-5 pm and feels she does not hae a life and concerned about wt gain      1. Have you been to the ER, urgent care clinic since your last visit? Hospitalized since your last visit? No     2. Have you seen or consulted any other health care providers outside of the [address] [place] [address] since your last visit? Include any pap smears or colon screening. No       Consult    Subjective:     Regina Short is a 69 y. o.right-handed  female seen for evaluation at the request of Dr. Kirill Poole of new problem of increasing headaches, not always responding to her medications of Maxalt, and most likely these are due to the increased stress she is having dealing with her  who tends to sleep all day. She was previously given the [place] trial but never could get the medication because she has Medicare. She did take her Zoloft which is helping some with the stress but she still having a lot of difficulty, and is on 200 mg a day that in 100 mg of Topamax twice a day and still having these headaches. We encouraged her to get a family intervention with her 's brothers and sisters to try to force him to get some help which may help her headaches. In the meantime we will try her on Nurtec which has less cardiovascular risk factors compared to Maxalt at her age.   We will give her some Maxalt in the interim just in case she still needs it to be find a more adequate treatment for her. She had no other new medical or neurological problem other than the fall last spring when she fractured her left hip. She has had no more falls. For her facial pain patient had an MRI of the brain that was unremarkable last year and an MRA of the brain that was unremarkable, and she was given gabapentin 300 mg 3 times a day, and after several weeks of therapy the pain went away and she went off the medication and has done well since then. She is also seen for her headaches, they seem to be doing better on Topamax 100 mg twice a day, and she rarely has a severe migraine and takes Maxalt as needed, and no longer uses the Phenergan. The headaches were worse in December and January in the winter months, but have gotten markedly better occurring once or twice a week only now, and the Maxalt usually will help. She understands the risk factors of the triptans at her age. She has never had a side effect of the medication. We advised that she needs to start exercising half an hour every day now every other day mild exercise, otherwise she will continue to get weaker in her legs and lose her ability to ambulate as well and lose her balance some, and she is encouraged to do both stretching and balance exercises, strengthening exercises with light weights and machines, and cardiovascular sizes as far as walking, treadmill, exercise cycle etc.  We also explained to her that exercise will help lessen her chance of Alzheimer's disease 2. She is already taking a B complex multivitamin. Her headaches are doing fair, and she had a switch to the Maxalt because of insurance issues, and that is working on the headaches but not as good as the Zomig did, but she is managing. Her headaches usually occur over mainly in the frontal and sinus area associated with marked allergies and sinus congestion that occur several times a month.   Her allergies have gotten somewhat better over the last few weeks, and the headaches are better and back to her normal baseline now. Her allergist retired and she does not know who to go to I suggested she see another physician in their group. She had no new focal weakness, sensory loss, fever, meningismus, or other causes of her headaches. Her  came with her and he is very concerned about her. She denies any increased stress tension or anxiety in addition. She is retired now and should be more relaxed. She does not get the aura anymore I told her that is exactly the opposite of what should happen, at her age she should be getting less headaches and more aura. Patient last seen one year ago. Patient has a long-standing history of headaches, and has occasional exacerbation that requires Medrol Dosepak to break her headache cycle. Her headaches are more bitemporal, throbbing in nature associated with nausea and vomiting and occur once or twice a month, and occasionally more often. Stress and tension are precipitating factors. 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 A complete review of systems in symptoms was negative for any new medical problems complications and illnesses other than mild anxiety, depression, skin cancer, and respiratory infections. Past Medical History:   Diagnosis Date    Headache     Headache(784.0)     Neurological disorder       No past surgical history on file. Family History   Problem Relation Age of Onset    Dementia Mother     Diabetes Mother     Heart Disease Father     Other Father         pad    Diabetes Father     Diabetes Brother       Social History     Tobacco Use    Smoking status: Never    Smokeless tobacco: Never   Substance Use Topics    Alcohol use: No       Current Outpatient Medications   Medication Sig Dispense Refill    rizatriptan (MAXALT-MLT) 10 mg disintegrating tablet Take 1 Tablet by mouth every twelve (12) hours as needed for Migraine.  12 Tablet 11  rimegepant (NURTEC) 75 mg disintegrating tablet Take 1 Tablet by mouth daily as needed for Migraine. 8 Tablet 5    sertraline (ZOLOFT) 100 mg tablet TAKE 1 TABLET BY MOUTH EVERY DAY 90 Tablet 4    topiramate (TOPAMAX) 100 mg tablet TAKE 1 TABLET BY MOUTH TWICE A  Tablet 4    montelukast (SINGULAIR) 10 mg tablet TAKE 1 TABLET BY MOUTH EVERY DAY      triamcinolone (NASACORT AQ) 55 mcg nasal inhaler 2 Sprays as needed.  magnesium oxide (MAG-OX) 400 mg tablet Take 400 mg by mouth daily.  co-enzyme Q-10 (CO Q-10) 100 mg capsule Take 300 mg by mouth daily.  cholecalciferol (VITAMIN D3) 25 mcg (1,000 unit) cap Take  by mouth daily.  multivitamins-minerals-lutein (MEN'S CENTRUM SILVER WITH LUTEIN) tab tablet Take  by mouth daily. Takes the Kathi Fusion Gummies - two gummies daily          Allergies   Allergen Reactions    Reglan [Metoclopramide] Anaphylaxis    Bactrim [Sulfamethoprim Ds] Rash    Pcn [Penicillins] Other (comments)        Review of Systems:  A comprehensive review of systems was negative except for: Neurological: positive for headaches     Objective:     I      NEUROLOGICAL EXAM:    Appearance: The patient is thinly developed and nourished, provides a coherent history and is in no acute distress. Mental Status: Oriented to time, place and person and the president. Speech is fluent without aphasia, and cognitive function and fund of knowledge normal for age. Mood and affect very stressed and depressed. Cranial Nerves:   Intact visual fields. Fundi are benign, disc are flat, no lesions seen on funduscopy. MAGI, EOM's full, no nystagmus, no ptosis. Facial sensation is normal. Corneal reflexes are not tested. Facial movement is symmetric. Hearing is normal bilaterally. Palate is midline with normal sternocleidomastoid and trapezius muscles are normal. Tongue is midline.   Neck is supple without meningismus or bruits  Temporal arteries not tender or enlarged   Patient is tenderness on palpation over the supraorbital nerve on the right side   Motor:  5/5 strength in upper and lower proximal and distal muscles. Normal bulk and tone. No fasciculations. Rapid alternating movement is symmetric   Reflexes:   Deep tendon reflexes 2+/4 and symmetrical. No Babinski or clonus present   Sensory:   Normal to touch, pinprick and vibration and temperature and DSS. Gait:  Normal gait. Tremor:   No tremor noted. Cerebellar:  No cerebellar signs present on Romberg, tandem, and finger-nose-finger exam.   Neurovascular:  Normal heart sounds and regular rhythm, peripheral pulses decreased in both feet and no carotid bruits. Assessment:         Plan:     Patient with new problem of progressive headaches, getting worse again, due to the stress of her  and seems depressed sleeping all the time, and does not respond to her efforts to get him to get up, so we talked to her in detail about psychiatric therapy and having a family intervention with all his brothers and sisters is probably the only way they can get him better. She plans on doing that that will help her headaches hopefully also. Because of the progressive headaches, we will try her on Nurtec which is a proved for Medicare patients, since she could not get the July before, and the risk and benefits all explained to her in detail. We gave her some Maxalt as a backup but advice of other cardiovascular side effects of Maxalt at her age and she will try to make the switch as above which she has not been willing to do in the past.  Patient's facial pain is doing much better with previous treatment, and he no longer needs the medication of Neurontin, and she had a normal MRI of the brain and MRA of the brain and evaluation of that last year.   Migraine seem to be well controlled on Topamax 100 mg twice a day and her Zoloft 200 mg once a day and her as needed Maxalt she will continue those   I reassured her that interactions with the triptan's or Zoloft are rare so she should be safe to continue her current medications which are providing adequate relief of her headaches. Recommended she see her allergy group and see another physician since her allergist has retired. Her medications are refilled for the patient today, her condition discussed with the patient in detail  Patient is to continue present medications. Patient was advised to remain mentally and physically active, exercising, take her vitamins and vitamin D and medications as prescribed. Refills given today for her medications  32-minute spent with the patient, going over her exam, discussing therapeutic options and treatment modalities with her in detail. Patient to be seen again in 6 months time or earlier if needed, and patient will call if any problems in the interim. Signed By: Alanna Mcconnell MD     September 6, 2022                     If you have questions, please do not hesitate to call me. I look forward to following your patient along with you.       Sincerely,    Alanna Mcconnell MD

## 2022-09-07 ENCOUNTER — TELEPHONE (OUTPATIENT)
Dept: NEUROLOGY | Age: 67
End: 2022-09-07

## 2022-09-07 NOTE — PROGRESS NOTES
Consult    Subjective:     Susan Weeks is a 69 y. o.right-handed  female seen for evaluation at the request of Dr. Radha Azul of new problem of increasing headaches, not always responding to her medications of Maxalt, and most likely these are due to the increased stress she is having dealing with her  who tends to sleep all day. She was previously given the [place] trial but never could get the medication because she has Medicare. She did take her Zoloft which is helping some with the stress but she still having a lot of difficulty, and is on 200 mg a day that in 100 mg of Topamax twice a day and still having these headaches. We encouraged her to get a family intervention with her 's brothers and sisters to try to force him to get some help which may help her headaches. In the meantime we will try her on Nurtec which has less cardiovascular risk factors compared to Maxalt at her age. We will give her some Maxalt in the interim just in case she still needs it to be find a more adequate treatment for her. She had no other new medical or neurological problem other than the fall last spring when she fractured her left hip. She has had no more falls. For her facial pain patient had an MRI of the brain that was unremarkable last year and an MRA of the brain that was unremarkable, and she was given gabapentin 300 mg 3 times a day, and after several weeks of therapy the pain went away and she went off the medication and has done well since then. She is also seen for her headaches, they seem to be doing better on Topamax 100 mg twice a day, and she rarely has a severe migraine and takes Maxalt as needed, and no longer uses the Phenergan. The headaches were worse in December and January in the winter months, but have gotten markedly better occurring once or twice a week only now, and the Maxalt usually will help. She understands the risk factors of the triptans at her age.   She has never had a side effect of the medication. We advised that she needs to start exercising half an hour every day now every other day mild exercise, otherwise she will continue to get weaker in her legs and lose her ability to ambulate as well and lose her balance some, and she is encouraged to do both stretching and balance exercises, strengthening exercises with light weights and machines, and cardiovascular sizes as far as walking, treadmill, exercise cycle etc.  We also explained to her that exercise will help lessen her chance of Alzheimer's disease 2. She is already taking a B complex multivitamin. Her headaches are doing fair, and she had a switch to the [employer] because of insurance issues, and that is working on the headaches but not as good as the Zomig did, but she is managing. Her headaches usually occur over mainly in the frontal and sinus area associated with marked allergies and sinus congestion that occur several times a month. Her allergies have gotten somewhat better over the last few weeks, and the headaches are better and back to her normal baseline now. Her allergist retired and she does not know who to go to I suggested she see another physician in their group. She had no new focal weakness, sensory loss, fever, meningismus, or other causes of her headaches. Her  came with her and he is very concerned about her. She denies any increased stress tension or anxiety in addition. She is retired now and should be more relaxed. She does not get the aura anymore I told her that is exactly the opposite of what should happen, at her age she should be getting less headaches and more aura. Patient last seen one year ago. Patient has a long-standing history of headaches, and has occasional exacerbation that requires Medrol Dosepak to break her headache cycle.  Her headaches are more bitemporal, throbbing in nature associated with nausea and vomiting and occur once or twice a month, and occasionally more often. Stress and tension are precipitating factors. 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 A complete review of systems in symptoms was negative for any new medical problems complications and illnesses other than mild anxiety, depression, skin cancer, and respiratory infections. Past Medical History:   Diagnosis Date    Headache     Headache(784.0)     Neurological disorder       No past surgical history on file. Family History   Problem Relation Age of Onset    Dementia Mother     Diabetes Mother     Heart Disease Father     Other Father         pad    Diabetes Father     Diabetes Brother       Social History     Tobacco Use    Smoking status: Never    Smokeless tobacco: Never   Substance Use Topics    Alcohol use: No       Current Outpatient Medications   Medication Sig Dispense Refill    rizatriptan (MAXALT-MLT) 10 mg disintegrating tablet Take 1 Tablet by mouth every twelve (12) hours as needed for Migraine. 12 Tablet 11    rimegepant (NURTEC) 75 mg disintegrating tablet Take 1 Tablet by mouth daily as needed for Migraine. 8 Tablet 5    sertraline (ZOLOFT) 100 mg tablet TAKE 1 TABLET BY MOUTH EVERY DAY 90 Tablet 4    topiramate (TOPAMAX) 100 mg tablet TAKE 1 TABLET BY MOUTH TWICE A  Tablet 4    montelukast (SINGULAIR) 10 mg tablet TAKE 1 TABLET BY MOUTH EVERY DAY      triamcinolone (NASACORT AQ) 55 mcg nasal inhaler 2 Sprays as needed. magnesium oxide (MAG-OX) 400 mg tablet Take 400 mg by mouth daily. co-enzyme Q-10 (CO Q-10) 100 mg capsule Take 300 mg by mouth daily. cholecalciferol (VITAMIN D3) 25 mcg (1,000 unit) cap Take  by mouth daily. multivitamins-minerals-lutein (MEN'S CENTRUM SILVER WITH LUTEIN) tab tablet Take  by mouth daily.  Takes the Kathi Fusion Gummies - two gummies daily          Allergies   Allergen Reactions    Reglan [Metoclopramide] Anaphylaxis    Bactrim [Sulfamethoprim Ds] Rash    Pcn [Penicillins] Other (comments)        Review of Systems:  A comprehensive review of systems was negative except for: Neurological: positive for headaches     Objective:     I      NEUROLOGICAL EXAM:    Appearance: The patient is thinly developed and nourished, provides a coherent history and is in no acute distress. Mental Status: Oriented to time, place and person and the president. Speech is fluent without aphasia, and cognitive function and fund of knowledge normal for age. Mood and affect very stressed and depressed. Cranial Nerves:   Intact visual fields. Fundi are benign, disc are flat, no lesions seen on funduscopy. MAGI, EOM's full, no nystagmus, no ptosis. Facial sensation is normal. Corneal reflexes are not tested. Facial movement is symmetric. Hearing is normal bilaterally. Palate is midline with normal sternocleidomastoid and trapezius muscles are normal. Tongue is midline. Neck is supple without meningismus or bruits  Temporal arteries not tender or enlarged   Patient is tenderness on palpation over the supraorbital nerve on the right side   Motor:  5/5 strength in upper and lower proximal and distal muscles. Normal bulk and tone. No fasciculations. Rapid alternating movement is symmetric   Reflexes:   Deep tendon reflexes 2+/4 and symmetrical. No Babinski or clonus present   Sensory:   Normal to touch, pinprick and vibration and temperature and DSS. Gait:  Normal gait. Tremor:   No tremor noted. Cerebellar:  No cerebellar signs present on Romberg, tandem, and finger-nose-finger exam.   Neurovascular:  Normal heart sounds and regular rhythm, peripheral pulses decreased in both feet and no carotid bruits.            Assessment:         Plan:     Patient with new problem of progressive headaches, getting worse again, due to the stress of her  and seems depressed sleeping all the time, and does not respond to her efforts to get him to get up, so we talked to her in detail about psychiatric therapy and having a family intervention with all his brothers and sisters is probably the only way they can get him better. She plans on doing that that will help her headaches hopefully also. Because of the progressive headaches, we will try her on Nurtec which is a proved for Medicare patients, since she could not get the [place] before, and the risk and benefits all explained to her in detail. We gave her some Maxalt as a backup but advice of other cardiovascular side effects of Maxalt at her age and she will try to make the switch as above which she has not been willing to do in the past.  Patient's facial pain is doing much better with previous treatment, and he no longer needs the medication of Neurontin, and she had a normal MRI of the brain and MRA of the brain and evaluation of that last year. Migraine seem to be well controlled on Topamax 100 mg twice a day and her Zoloft 200 mg once a day and her as needed Maxalt she will continue those   I reassured her that interactions with the triptan's or Zoloft are rare so she should be safe to continue her current medications which are providing adequate relief of her headaches. Recommended she see her allergy group and see another physician since her allergist has retired. Her medications are refilled for the patient today, her condition discussed with the patient in detail  Patient is to continue present medications. Patient was advised to remain mentally and physically active, exercising, take her vitamins and vitamin D and medications as prescribed. Refills given today for her medications  32-minute spent with the patient, going over her exam, discussing therapeutic options and treatment modalities with her in detail. Patient to be seen again in 6 months time or earlier if needed, and patient will call if any problems in the interim.     Signed By: Tamir Box MD     September 6, 2022

## 2022-09-07 NOTE — TELEPHONE ENCOUNTER
Spoke with patient. Verified patient with two patient identifiers. Advised we received fax from [facility], stating Nurtec is not covered by her ins. Advised to call her insurance and see which meds are on formaulary and let us know. States she is also going to check GoodRx to see what the cost will be. She will let us know is she decides to proceed and call her ins company for other meds we can order. Patient verbalized understanding.

## 2022-09-08 NOTE — TELEPHONE ENCOUNTER
Spoke with patient. Verified patient with two patient identifiers. States she called the insurance co regarding Nurtec and other options. States Nurtec and all the others are too expensive. She ran the cost and all of them are too expensive. Ran them through [employer], still too expensive. States she would make too much for Pt Assistance. Does not want to change, wants to continue with Maxalt. Advised will inform Dr. Robert Vo. Patient verbalized understanding.

## 2022-11-19 ENCOUNTER — TELEPHONE (OUTPATIENT)
Dept: NEUROLOGY | Age: 67
End: 2022-11-19

## 2022-11-19 RX ORDER — METHYLPREDNISOLONE 4 MG/1
4 TABLET ORAL
Qty: 1 DOSE PACK | Refills: 0 | Status: SHIPPED | OUTPATIENT
Start: 2022-11-19

## 2022-11-19 NOTE — TELEPHONE ENCOUNTER
Patient called stating that she is having one of her typical migraines with typical quality and severity however it is lasted about 4 days now. She states that in the past her primary neurologist Dr. Pierre Covert has ordered Medrol Dosepak for abortive status migrainosus. She has taken 2 tablets of her 10 mg Maxalt. Sent over prescription for Medrol Dosepak.

## 2023-02-07 DIAGNOSIS — E55.9 VITAMIN D DEFICIENCY: ICD-10-CM

## 2023-02-07 DIAGNOSIS — G44.209 TENSION VASCULAR HEADACHE: ICD-10-CM

## 2023-02-07 DIAGNOSIS — E53.8 B12 DEFICIENCY: ICD-10-CM

## 2023-02-07 DIAGNOSIS — G43.109 MIGRAINE WITH AURA AND WITHOUT STATUS MIGRAINOSUS, NOT INTRACTABLE: ICD-10-CM

## 2023-02-07 DIAGNOSIS — G51.8 FACIAL NEURALGIA: ICD-10-CM

## 2023-02-07 RX ORDER — RIZATRIPTAN BENZOATE 10 MG/1
10 TABLET, ORALLY DISINTEGRATING ORAL
Qty: 36 TABLET | Refills: 4 | Status: SHIPPED | OUTPATIENT
Start: 2023-02-07

## 2023-04-06 ENCOUNTER — OFFICE VISIT (OUTPATIENT)
Dept: NEUROLOGY | Age: 68
End: 2023-04-06
Payer: MEDICARE

## 2023-04-06 PROCEDURE — G8510 SCR DEP NEG, NO PLAN REQD: HCPCS | Performed by: PSYCHIATRY & NEUROLOGY

## 2023-04-06 PROCEDURE — G8427 DOCREV CUR MEDS BY ELIG CLIN: HCPCS | Performed by: PSYCHIATRY & NEUROLOGY

## 2023-04-06 PROCEDURE — 99213 OFFICE O/P EST LOW 20 MIN: CPT | Performed by: PSYCHIATRY & NEUROLOGY

## 2023-04-06 PROCEDURE — 1090F PRES/ABSN URINE INCON ASSESS: CPT | Performed by: PSYCHIATRY & NEUROLOGY

## 2023-04-06 PROCEDURE — 3017F COLORECTAL CA SCREEN DOC REV: CPT | Performed by: PSYCHIATRY & NEUROLOGY

## 2023-04-06 PROCEDURE — 1101F PT FALLS ASSESS-DOCD LE1/YR: CPT | Performed by: PSYCHIATRY & NEUROLOGY

## 2023-04-06 PROCEDURE — G8536 NO DOC ELDER MAL SCRN: HCPCS | Performed by: PSYCHIATRY & NEUROLOGY

## 2023-04-06 PROCEDURE — 1123F ACP DISCUSS/DSCN MKR DOCD: CPT | Performed by: PSYCHIATRY & NEUROLOGY

## 2023-04-06 PROCEDURE — G8400 PT W/DXA NO RESULTS DOC: HCPCS | Performed by: PSYCHIATRY & NEUROLOGY

## 2023-04-06 PROCEDURE — G8420 CALC BMI NORM PARAMETERS: HCPCS | Performed by: PSYCHIATRY & NEUROLOGY

## 2023-04-06 NOTE — PROGRESS NOTES
Chief Complaint   Patient presents with    Migraine     Follow up - comes with change of weather - did not have yesterday - had four days in a row before that      1. Have you been to the ER, urgent care clinic since your last visit? Hospitalized since your last visit? No     2. Have you seen or consulted any other health care providers outside of the [address] [place] [address] since your last visit? Include any pap smears or colon screening.   Yes Dr Dickson Amaro - eye

## 2023-04-06 NOTE — LETTER
4/6/2023    Patient: Sana Waldrop   YOB: 1955   Date of Visit: 4/6/2023     Sybil Urban MD  [address] [place] [address] [address]  [address] [place] [address]  Via Fax: [phone]    Dear Sybil Urban MD,      Thank you for referring Ms. Tariq Marion to [address] [place] [address] for evaluation. My notes for this consultation are attached. Chief Complaint   Patient presents with    Migraine     Follow up - comes with change of weather - did not have yesterday - had four days in a row before that      1. Have you been to the ER, urgent care clinic since your last visit? Hospitalized since your last visit? No     2. Have you seen or consulted any other health care providers outside of the [address] [place] [address] since your last visit? Include any pap smears or colon screening. Yes Dr Sarika Messina - eye       Consult    Subjective:     Sana Waldrop is a 69 y. o.right-handed  female seen for evaluation at the request of Dr. Deb Weaver of new problem of being more unsteady on her feet, and watching her walk, she does have some slight instability but she does not exercise and does not walk, we advised her that the left exercise every day about half an hour of mild exercise to maintain good function, and she needs to start that, and that we all tend to lose her balance slightly as we get older and that is why I have to people [de-identified]years of age on canes and walkers. She said she would start doing that and make sure she takes her vitamins and vitamin D. If it gets worse we can always do an MRI later. Problem of tension vascular headaches, not always responding to her medications of Maxalt, and we gave her Cortez Matte and that was not covered so last visit we try to get Nurtec, but that was not covered either and she does not want to try again.   She is taken Imitrex in the past and has failed to triptans and could be a candidate for one of the new CGRP inhibitors but she will just take with the Maxalt and her Topamax as headache prevention. She is doing well on the Zoloft 100 mg a day to which is helped her depression. She has had increased stress, and increased headaches at times, and most likely these are due to the increased stress she is having dealing with her  who tends to sleep all day. She had no other new medical or neurological problem other than the fall last spring when she fractured her left hip. She has had no more falls. For her facial pain patient had an MRI of the brain that was unremarkable last year and an MRA of the brain that was unremarkable, and she was given gabapentin 300 mg 3 times a day, and after several weeks of therapy the pain went away and she went off the medication and has done well since then. She is also seen for her headaches, they seem to be doing better on Topamax 100 mg twice a day, and she rarely has a severe migraine and takes Maxalt as needed, and no longer uses the Phenergan. The headaches were worse in December and January in the winter months, but have gotten markedly better occurring once or twice a week only now, and the Maxalt usually will help. She understands the risk factors of the triptans at her age. She has never had a side effect of the medication. We advised that she needs to start exercising half an hour every day now every other day mild exercise, otherwise she will continue to get weaker in her legs and lose her ability to ambulate as well and lose her balance some, and she is encouraged to do both stretching and balance exercises, strengthening exercises with light weights and machines, and cardiovascular sizes as far as walking, treadmill, exercise cycle etc.  We also explained to her that exercise will help lessen her chance of Alzheimer's disease 2. She is already taking a B complex multivitamin.    Her headaches are doing fair, and she had a switch to the Maxalt because of insurance issues, and that is working on the headaches but not as good as the Zomig did, but she is managing. Her headaches usually occur over mainly in the frontal and sinus area associated with marked allergies and sinus congestion that occur several times a month. Her allergies have gotten somewhat better over the last few weeks, and the headaches are better and back to her normal baseline now. Her allergist retired and she does not know who to go to I suggested she see another physician in their group. She had no new focal weakness, sensory loss, fever, meningismus, or other causes of her headaches. Her  came with her and he is very concerned about her. She denies any increased stress tension or anxiety in addition. She is retired now and should be more relaxed. She does not get the aura anymore I told her that is exactly the opposite of what should happen, at her age she should be getting less headaches and more aura. Patient last seen one year ago. Patient has a long-standing history of headaches, and has occasional exacerbation that requires Medrol Dosepak to break her headache cycle. Her headaches are more bitemporal, throbbing in nature associated with nausea and vomiting and occur once or twice a month, and occasionally more often. Stress and tension are precipitating factors. 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 A complete review of systems in symptoms was negative for any new medical problems complications and illnesses other than mild anxiety, depression, skin cancer, and respiratory infections. Past Medical History:   Diagnosis Date    Headache     Headache(784.0)     Neurological disorder       No past surgical history on file.   Family History   Problem Relation Age of Onset    Dementia Mother     Diabetes Mother     Heart Disease Father     Other Father         pad    Diabetes Father     Diabetes Brother       Social History     Tobacco Use    Smoking status: Never    Smokeless tobacco: Never   Substance Use Topics    Alcohol use: No       Current Outpatient Medications   Medication Sig Dispense Refill    simvastatin (ZOCOR) 20 mg tablet Take 1 Tablet by mouth every evening. rizatriptan (MAXALT-MLT) 10 mg disintegrating tablet TAKE 1 TABLET BY MOUTH EVERY TWELVE (12) HOURS AS NEEDED FOR MIGRAINE. 36 Tablet 4    sertraline (ZOLOFT) 100 mg tablet TAKE 1 TABLET BY MOUTH EVERY DAY 90 Tablet 4    topiramate (TOPAMAX) 100 mg tablet TAKE 1 TABLET BY MOUTH TWICE A  Tablet 4    montelukast (SINGULAIR) 10 mg tablet TAKE 1 TABLET BY MOUTH EVERY DAY      triamcinolone (NASACORT AQ) 55 mcg nasal inhaler 2 Sprays as needed. magnesium oxide (MAG-OX) 400 mg tablet Take 1 Tablet by mouth daily. co-enzyme Q-10 (CO Q-10) 100 mg capsule Take 3 Capsules by mouth daily. cholecalciferol (VITAMIN D3) 25 mcg (1,000 unit) cap Take  by mouth daily. multivitamins-minerals-lutein (MEN'S CENTRUM SILVER WITH LUTEIN) tab tablet Take  by mouth daily. Takes the Kathi Fusion Gummies - two gummies daily          Allergies   Allergen Reactions    Reglan [Metoclopramide] Anaphylaxis    Bactrim [Sulfamethoprim Ds] Rash    Pcn [Penicillins] Other (comments)        Review of Systems:  A comprehensive review of systems was negative except for: Neurological: positive for headaches     Objective:     I      NEUROLOGICAL EXAM:    Appearance: The patient is thinly developed and nourished, provides a coherent history and is in no acute distress. Mental Status: Oriented to time, place and person and the president. Speech is fluent without aphasia, and cognitive function and fund of knowledge normal for age. Mood and affect very stressed and depressed. Cranial Nerves:   Intact visual fields. Fundi are benign, disc are flat, no lesions seen on funduscopy. MAGI, EOM's full, no nystagmus, no ptosis. Facial sensation is normal. Corneal reflexes are not tested.  Facial movement is symmetric. Hearing is normal bilaterally. Palate is midline with normal sternocleidomastoid and trapezius muscles are normal. Tongue is midline. Neck is supple without meningismus or bruits  Temporal arteries not tender or enlarged   Patient is tenderness on palpation over the supraorbital nerve on the right side   Motor:  5/5 strength in upper and lower proximal and distal muscles. Normal bulk and tone. No fasciculations. Rapid alternating movement is symmetric   Reflexes:   Deep tendon reflexes 2+/4 and symmetrical. No Babinski or clonus present   Sensory:   Normal to touch, pinprick and vibration and temperature and DSS. Gait:  Abnormal gait as she has a very mildly ataxic gait. .   Tremor:   No tremor noted. Cerebellar:  Mildly abnormal cerebellar signs present on Romberg, tandem, and finger-nose-finger exam.   Neurovascular:  Normal heart sounds and regular rhythm, peripheral pulses decreased in both feet and no carotid bruits. Assessment:         Plan: For her gait instability she encouraged to start exercising more, take her vitamins, and we can even offer her physical therapy, and if it continues to worsen we may need to repeat an MRI of the brain. Patient with recent exacerbation of headaches due to stress of dealing with her , returns to sleep well at times, we gave her Zoloft and that is helped a lot, and she will continue that in the Maxalt which usually will relieve the headaches. Occasionally she will take a second dose. For preventative therapy, she is on Topamax 100 mg twice a day and tolerating that well we will continue that. She could not get Ubrelvy or Nurtec approved and does not want to try again. Patient's facial pain is doing much better with previous treatment, and he no longer needs the medication of Neurontin, and she had a normal MRI of the brain and MRA of the brain and evaluation of that last year.   Migraine seem to be well controlled on Topamax 100 mg twice a day and her Zoloft 100 mg once a day and her as needed Maxalt she will continue those   I reassured her that interactions with the triptan's or Zoloft are rare so she should be safe to continue her current medications which are providing adequate relief of her headaches. Recommended she see her allergy group and see another physician since her allergist has retired. Her medications are refilled for the patient today, her condition discussed with the patient in detail  Patient is to continue present medications. Patient was advised to remain mentally and physically active, exercising, take her vitamins and vitamin D and medications as prescribed. Refills given today for her medications  28-minute spent with the patient, going over her exam, discussing therapeutic options and treatment modalities with her in detail. Patient to be seen again in 12 months time or earlier if needed, and patient will call if any problems in the interim. Signed By: Joselin Perez MD     April 6, 2023                    If you have questions, please do not hesitate to call me. I look forward to following your patient along with you.       Sincerely,    Joselin Perez MD

## 2023-04-07 NOTE — PROGRESS NOTES
Consult    Subjective:     Blue Murphy is a 69 y. o.right-handed  female seen for evaluation at the request of Dr. Sukhdev Chung of new problem of being more unsteady on her feet, and watching her walk, she does have some slight instability but she does not exercise and does not walk, we advised her that the left exercise every day about half an hour of mild exercise to maintain good function, and she needs to start that, and that we all tend to lose her balance slightly as we get older and that is why I have to people [de-identified]years of age on canes and walkers. She said she would start doing that and make sure she takes her vitamins and vitamin D. If it gets worse we can always do an MRI later. Problem of tension vascular headaches, not always responding to her medications of Maxalt, and we gave her Esequiel Handing and that was not covered so last visit we try to get Nurtec, but that was not covered either and she does not want to try again. She is taken Imitrex in the past and has failed to triptans and could be a candidate for one of the new CGRP inhibitors but she will just take with the Maxalt and her Topamax as headache prevention. She is doing well on the Zoloft 100 mg a day to which is helped her depression. She has had increased stress, and increased headaches at times, and most likely these are due to the increased stress she is having dealing with her  who tends to sleep all day. She had no other new medical or neurological problem other than the fall last spring when she fractured her left hip. She has had no more falls. For her facial pain patient had an MRI of the brain that was unremarkable last year and an MRA of the brain that was unremarkable, and she was given gabapentin 300 mg 3 times a day, and after several weeks of therapy the pain went away and she went off the medication and has done well since then.   She is also seen for her headaches, they seem to be doing better on Topamax 100 mg twice a day, and she rarely has a severe migraine and takes Maxalt as needed, and no longer uses the Phenergan. The headaches were worse in December and January in the winter months, but have gotten markedly better occurring once or twice a week only now, and the Maxalt usually will help. She understands the risk factors of the triptans at her age. She has never had a side effect of the medication. We advised that she needs to start exercising half an hour every day now every other day mild exercise, otherwise she will continue to get weaker in her legs and lose her ability to ambulate as well and lose her balance some, and she is encouraged to do both stretching and balance exercises, strengthening exercises with light weights and machines, and cardiovascular sizes as far as walking, treadmill, exercise cycle etc.  We also explained to her that exercise will help lessen her chance of Alzheimer's disease 2. She is already taking a B complex multivitamin. Her headaches are doing fair, and she had a switch to the Maxalt because of insurance issues, and that is working on the headaches but not as good as the Zomig did, but she is managing. Her headaches usually occur over mainly in the frontal and sinus area associated with marked allergies and sinus congestion that occur several times a month. Her allergies have gotten somewhat better over the last few weeks, and the headaches are better and back to her normal baseline now. Her allergist retired and she does not know who to go to I suggested she see another physician in their group. She had no new focal weakness, sensory loss, fever, meningismus, or other causes of her headaches. Her  came with her and he is very concerned about her. She denies any increased stress tension or anxiety in addition. She is retired now and should be more relaxed.   She does not get the aura anymore I told her that is exactly the opposite of what should happen, at her age she should be getting less headaches and more aura. Patient last seen one year ago. Patient has a long-standing history of headaches, and has occasional exacerbation that requires Medrol Dosepak to break her headache cycle. Her headaches are more bitemporal, throbbing in nature associated with nausea and vomiting and occur once or twice a month, and occasionally more often. Stress and tension are precipitating factors. 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 A complete review of systems in symptoms was negative for any new medical problems complications and illnesses other than mild anxiety, depression, skin cancer, and respiratory infections. Past Medical History:   Diagnosis Date    Headache     Headache(784.0)     Neurological disorder       No past surgical history on file. Family History   Problem Relation Age of Onset    Dementia Mother     Diabetes Mother     Heart Disease Father     Other Father         pad    Diabetes Father     Diabetes Brother       Social History     Tobacco Use    Smoking status: Never    Smokeless tobacco: Never   Substance Use Topics    Alcohol use: No       Current Outpatient Medications   Medication Sig Dispense Refill    simvastatin (ZOCOR) 20 mg tablet Take 1 Tablet by mouth every evening. rizatriptan (MAXALT-MLT) 10 mg disintegrating tablet TAKE 1 TABLET BY MOUTH EVERY TWELVE (12) HOURS AS NEEDED FOR MIGRAINE. 36 Tablet 4    sertraline (ZOLOFT) 100 mg tablet TAKE 1 TABLET BY MOUTH EVERY DAY 90 Tablet 4    topiramate (TOPAMAX) 100 mg tablet TAKE 1 TABLET BY MOUTH TWICE A  Tablet 4    montelukast (SINGULAIR) 10 mg tablet TAKE 1 TABLET BY MOUTH EVERY DAY      triamcinolone (NASACORT AQ) 55 mcg nasal inhaler 2 Sprays as needed. magnesium oxide (MAG-OX) 400 mg tablet Take 1 Tablet by mouth daily. co-enzyme Q-10 (CO Q-10) 100 mg capsule Take 3 Capsules by mouth daily.       cholecalciferol (VITAMIN D3) 25 mcg (1,000 unit) cap Take  by mouth daily. multivitamins-minerals-lutein (MEN'S CENTRUM SILVER WITH LUTEIN) tab tablet Take  by mouth daily. Takes the Kathi Fusion Gummies - two gummies daily          Allergies   Allergen Reactions    Reglan [Metoclopramide] Anaphylaxis    Bactrim [Sulfamethoprim Ds] Rash    Pcn [Penicillins] Other (comments)        Review of Systems:  A comprehensive review of systems was negative except for: Neurological: positive for headaches     Objective:     I      NEUROLOGICAL EXAM:    Appearance: The patient is thinly developed and nourished, provides a coherent history and is in no acute distress. Mental Status: Oriented to time, place and person and the president. Speech is fluent without aphasia, and cognitive function and fund of knowledge normal for age. Mood and affect very stressed and depressed. Cranial Nerves:   Intact visual fields. Fundi are benign, disc are flat, no lesions seen on funduscopy. MAGI, EOM's full, no nystagmus, no ptosis. Facial sensation is normal. Corneal reflexes are not tested. Facial movement is symmetric. Hearing is normal bilaterally. Palate is midline with normal sternocleidomastoid and trapezius muscles are normal. Tongue is midline. Neck is supple without meningismus or bruits  Temporal arteries not tender or enlarged   Patient is tenderness on palpation over the supraorbital nerve on the right side   Motor:  5/5 strength in upper and lower proximal and distal muscles. Normal bulk and tone. No fasciculations. Rapid alternating movement is symmetric   Reflexes:   Deep tendon reflexes 2+/4 and symmetrical. No Babinski or clonus present   Sensory:   Normal to touch, pinprick and vibration and temperature and DSS. Gait:  Abnormal gait as she has a very mildly ataxic gait. .   Tremor:   No tremor noted.    Cerebellar:  Mildly abnormal cerebellar signs present on Romberg, tandem, and finger-nose-finger exam.   Neurovascular:  Normal heart sounds and regular rhythm, peripheral pulses decreased in both feet and no carotid bruits. Assessment:         Plan: For her gait instability she encouraged to start exercising more, take her vitamins, and we can even offer her physical therapy, and if it continues to worsen we may need to repeat an MRI of the brain. Patient with recent exacerbation of headaches due to stress of dealing with her , returns to sleep well at times, we gave her Zoloft and that is helped a lot, and she will continue that in the Maxalt which usually will relieve the headaches. Occasionally she will take a second dose. For preventative therapy, she is on Topamax 100 mg twice a day and tolerating that well we will continue that. She could not get Ubrelvy or Nurtec approved and does not want to try again. Patient's facial pain is doing much better with previous treatment, and he no longer needs the medication of Neurontin, and she had a normal MRI of the brain and MRA of the brain and evaluation of that last year. Migraine seem to be well controlled on Topamax 100 mg twice a day and her Zoloft 100 mg once a day and her as needed Maxalt she will continue those   I reassured her that interactions with the triptan's or Zoloft are rare so she should be safe to continue her current medications which are providing adequate relief of her headaches. Recommended she see her allergy group and see another physician since her allergist has retired. Her medications are refilled for the patient today, her condition discussed with the patient in detail  Patient is to continue present medications. Patient was advised to remain mentally and physically active, exercising, take her vitamins and vitamin D and medications as prescribed. Refills given today for her medications  28-minute spent with the patient, going over her exam, discussing therapeutic options and treatment modalities with her in detail.   Patient to be seen again in 12 months time or earlier if needed, and patient will call if any problems in the interim.     Signed By: Gianfranco Warner MD     April 6, 2023

## 2023-05-24 RX ORDER — TRIAMCINOLONE ACETONIDE 55 UG/1
2 SPRAY, METERED NASAL PRN
COMMUNITY

## 2023-05-24 RX ORDER — SIMVASTATIN 20 MG
20 TABLET ORAL EVERY EVENING
COMMUNITY
Start: 2023-01-12

## 2023-05-24 RX ORDER — RIZATRIPTAN BENZOATE 10 MG/1
10 TABLET, ORALLY DISINTEGRATING ORAL
COMMUNITY
Start: 2023-02-07

## 2023-05-24 RX ORDER — SERTRALINE HYDROCHLORIDE 100 MG/1
1 TABLET, FILM COATED ORAL DAILY
COMMUNITY
Start: 2022-08-29

## 2023-05-24 RX ORDER — MONTELUKAST SODIUM 10 MG/1
1 TABLET ORAL DAILY
COMMUNITY
Start: 2020-07-20

## 2023-05-24 RX ORDER — UBIDECARENONE 100 MG
300 CAPSULE ORAL DAILY
COMMUNITY

## 2023-05-24 RX ORDER — MAGNESIUM OXIDE 400 MG/1
400 TABLET ORAL DAILY
COMMUNITY

## 2023-05-24 RX ORDER — TOPIRAMATE 100 MG/1
1 TABLET, FILM COATED ORAL 2 TIMES DAILY
COMMUNITY
Start: 2022-08-29

## 2023-10-13 ENCOUNTER — TELEPHONE (OUTPATIENT)
Age: 68
End: 2023-10-13

## 2023-10-13 NOTE — TELEPHONE ENCOUNTER
Patient called stating she is having issues with her pharmacy refilling her medications Sertraline (Zoloft) 100 mg tabs & Topiramate (Topamax) 100 mg tabs. Patient is running low and asked that we please call her pharmacy.     [facility] on [address] [place] [address]  ([phone])

## 2023-10-17 RX ORDER — TOPIRAMATE 100 MG/1
100 TABLET, FILM COATED ORAL 2 TIMES DAILY
Qty: 180 TABLET | Refills: 4 | Status: SHIPPED | OUTPATIENT
Start: 2023-10-17

## 2023-10-17 RX ORDER — SERTRALINE HYDROCHLORIDE 100 MG/1
100 TABLET, FILM COATED ORAL DAILY
Qty: 90 TABLET | Refills: 3 | Status: SHIPPED | OUTPATIENT
Start: 2023-10-17

## 2023-10-17 NOTE — TELEPHONE ENCOUNTER
Last office visit: 4/6/2023  Next office visit: 4/8/2024  Last med refill:   Topiramate 8/29/2022  Sertraline 8/29/2022

## 2024-01-13 DIAGNOSIS — G44.209 TENSION-TYPE HEADACHE, UNSPECIFIED, NOT INTRACTABLE: ICD-10-CM

## 2024-01-13 DIAGNOSIS — E53.8 DEFICIENCY OF OTHER SPECIFIED B GROUP VITAMINS: ICD-10-CM

## 2024-01-15 RX ORDER — RIZATRIPTAN BENZOATE 10 MG/1
TABLET, ORALLY DISINTEGRATING ORAL
Qty: 36 TABLET | Refills: 4 | Status: SHIPPED | OUTPATIENT
Start: 2024-01-15

## 2024-03-20 DIAGNOSIS — E53.8 DEFICIENCY OF OTHER SPECIFIED B GROUP VITAMINS: ICD-10-CM

## 2024-03-20 DIAGNOSIS — G44.209 TENSION-TYPE HEADACHE, UNSPECIFIED, NOT INTRACTABLE: ICD-10-CM

## 2024-03-21 RX ORDER — RIZATRIPTAN BENZOATE 10 MG/1
TABLET, ORALLY DISINTEGRATING ORAL
Qty: 36 TABLET | Refills: 2 | OUTPATIENT
Start: 2024-03-21

## 2024-03-21 NOTE — TELEPHONE ENCOUNTER
Last office visit 4/6/2023  Next office visit 4/8/2024  Last med refill 1/15/2024 plus 4 refills.

## 2024-04-08 ENCOUNTER — OFFICE VISIT (OUTPATIENT)
Age: 69
End: 2024-04-08
Payer: MEDICARE

## 2024-04-08 VITALS
HEART RATE: 80 BPM | BODY MASS INDEX: 24.67 KG/M2 | DIASTOLIC BLOOD PRESSURE: 84 MMHG | WEIGHT: 157.2 LBS | HEIGHT: 67 IN | TEMPERATURE: 97.6 F | SYSTOLIC BLOOD PRESSURE: 126 MMHG | RESPIRATION RATE: 18 BRPM | OXYGEN SATURATION: 98 %

## 2024-04-08 DIAGNOSIS — R41.82 ACUTE ALTERATION IN MENTAL STATUS: ICD-10-CM

## 2024-04-08 DIAGNOSIS — R56.9 CONVULSIONS, UNSPECIFIED CONVULSION TYPE (HCC): ICD-10-CM

## 2024-04-08 DIAGNOSIS — H53.9 TRANSIENT VISION DISTURBANCE OF BOTH EYES: ICD-10-CM

## 2024-04-08 DIAGNOSIS — G44.209 TENSION VASCULAR HEADACHE: ICD-10-CM

## 2024-04-08 DIAGNOSIS — I65.23 BILATERAL CAROTID ARTERY STENOSIS: ICD-10-CM

## 2024-04-08 DIAGNOSIS — G43.109 MIGRAINE WITH AURA AND WITHOUT STATUS MIGRAINOSUS, NOT INTRACTABLE: Primary | ICD-10-CM

## 2024-04-08 DIAGNOSIS — G40.209 SIMPLE PARTIAL ONSET SEIZURES FOLLOWED BY IMPAIRED CONSCIOUSNESS (HCC): ICD-10-CM

## 2024-04-08 PROCEDURE — 1036F TOBACCO NON-USER: CPT | Performed by: PSYCHIATRY & NEUROLOGY

## 2024-04-08 PROCEDURE — G8420 CALC BMI NORM PARAMETERS: HCPCS | Performed by: PSYCHIATRY & NEUROLOGY

## 2024-04-08 PROCEDURE — 1090F PRES/ABSN URINE INCON ASSESS: CPT | Performed by: PSYCHIATRY & NEUROLOGY

## 2024-04-08 PROCEDURE — 1123F ACP DISCUSS/DSCN MKR DOCD: CPT | Performed by: PSYCHIATRY & NEUROLOGY

## 2024-04-08 PROCEDURE — G8400 PT W/DXA NO RESULTS DOC: HCPCS | Performed by: PSYCHIATRY & NEUROLOGY

## 2024-04-08 PROCEDURE — G8427 DOCREV CUR MEDS BY ELIG CLIN: HCPCS | Performed by: PSYCHIATRY & NEUROLOGY

## 2024-04-08 PROCEDURE — 99214 OFFICE O/P EST MOD 30 MIN: CPT | Performed by: PSYCHIATRY & NEUROLOGY

## 2024-04-08 PROCEDURE — 3017F COLORECTAL CA SCREEN DOC REV: CPT | Performed by: PSYCHIATRY & NEUROLOGY

## 2024-04-08 ASSESSMENT — PATIENT HEALTH QUESTIONNAIRE - PHQ9
SUM OF ALL RESPONSES TO PHQ QUESTIONS 1-9: 0
SUM OF ALL RESPONSES TO PHQ9 QUESTIONS 1 & 2: 0
1. LITTLE INTEREST OR PLEASURE IN DOING THINGS: NOT AT ALL
2. FEELING DOWN, DEPRESSED OR HOPELESS: NOT AT ALL
SUM OF ALL RESPONSES TO PHQ QUESTIONS 1-9: 0

## 2024-04-09 NOTE — PROGRESS NOTES
Consult  REFERRED BY:  Joe Churchill MD    CHIEF COMPLAINT: New problem of sudden facial twitching and sudden visual blurring in her left eye, and another episode of sudden tunnel vision in both eyes just recently without headache      Subjective:     Augusta Tierney is a 68 y.o..right-handed  female seen for urgent work in evaluation at the request of Dr. Churchill of new problem of having an episode that occurred recently when her left eye began twitching and she had sudden blurring in her left eye also, with this lasting about 30 seconds, before it cleared, and if she had no other precipitating factors or other twitching or myoclonus or seizure activity anywhere else.  Then shortly after that she had an episode of another 32nd episode of tunnel vision in both eyes, that came on spontaneously also and she is concerned about these.  She has never had this type of spells before.  She went to her eye doctor and they could not find anything wrong with her eyes.  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  For this episode we did a carotid Doppler study today that was unremarkable just showed minimal disease.  Patient was previously seen for being more unsteady on her feet, and watching her walk, she does have some slight instability but she does not exercise and does not walk, we advised her that the left exercise every day about half an hour of mild exercise to maintain good function, and she needs to start that, and that we all tend to lose her balance slightly as we get older and that is why I have to people 80 years of age on canes and walkers.  She said she would start doing that and make sure she takes her vitamins and vitamin D.  If it gets worse we can always do an MRI later.  Problem of tension vascular headaches, not always responding to her medications of Maxalt, and

## 2024-04-30 ENCOUNTER — TELEPHONE (OUTPATIENT)
Age: 69
End: 2024-04-30

## 2024-04-30 DIAGNOSIS — G43.109 MIGRAINE WITH AURA AND WITHOUT STATUS MIGRAINOSUS, NOT INTRACTABLE: Primary | ICD-10-CM

## 2024-04-30 RX ORDER — UBROGEPANT 50 MG/1
TABLET ORAL
Qty: 16 TABLET | Refills: 11 | Status: SHIPPED | OUTPATIENT
Start: 2024-04-30

## 2024-04-30 NOTE — TELEPHONE ENCOUNTER
Patient states that the is not helping rizatriptan (MAXALT-MLT) 10 MG disintegrating tablet  with her migraines. Patient would like to know if something else can be sent to her [facility]. She has been up to taking the medication every 6 hours with some relief. Pt also states that she has already scheduled her MRI and EEG. Please her at [phone]

## 2024-04-30 NOTE — TELEPHONE ENCOUNTER
I sent in new prescription for Ubrelvy for the patient to take for headaches, which is what we tried to offer her last time.

## 2024-05-01 DIAGNOSIS — G43.109 MIGRAINE WITH AURA AND WITHOUT STATUS MIGRAINOSUS, NOT INTRACTABLE: Primary | ICD-10-CM

## 2024-05-01 DIAGNOSIS — G43.109 MIGRAINE WITH AURA AND WITHOUT STATUS MIGRAINOSUS, NOT INTRACTABLE: ICD-10-CM

## 2024-05-01 RX ORDER — PROMETHAZINE HYDROCHLORIDE 25 MG/1
25 SUPPOSITORY RECTAL EVERY 6 HOURS PRN
Qty: 12 SUPPOSITORY | Refills: 11 | Status: SHIPPED | OUTPATIENT
Start: 2024-05-01

## 2024-05-01 RX ORDER — ZOLMITRIPTAN 2.5 MG/1
1 SPRAY, METERED NASAL EVERY 12 HOURS PRN
Qty: 12 EACH | Refills: 11 | Status: SHIPPED | OUTPATIENT
Start: 2024-05-01

## 2024-05-01 NOTE — TELEPHONE ENCOUNTER
Patient states she would like to go back to the nasal spray medication. She doesn't think the ubrelvy will work because it makes her nauseous.     She states if Dr. Chisholm really wants her to take the the ubrelvy, she will need something to help her with the nausea. Please give her a call back at . Thank you.

## 2024-05-02 RX ORDER — PROCHLORPERAZINE 25 MG
SUPPOSITORY, RECTAL RECTAL
Refills: 0 | OUTPATIENT
Start: 2024-05-02

## 2024-05-02 NOTE — TELEPHONE ENCOUNTER
Pharmacy request alternative medication      prochlorperazine (COMPAZINE) 25 MG suppository         Changed from: promethazine (PHENERGAN) 25 MG suppository    All pharmacy suggested alternatives are listed below    Sig: N/A    Disp: Not specified    Refills: 0    Start: 5/1/2024    Class: Normal    For: Migraine with aura and without status migrainosus, not intractable    Last ordered: Yesterday (5/1/2024) by Cedric Chisholm MD    Last refill: 5/1/2024    Rx #: [id number]    Pharmacy comment: Alternative Requested:THE PRESCRIBED MEDICATION IS NOT COVERED BY INSURANCE. PLEASE CONSIDER CHANGING TO ONE OF THE SUGGESTED COVERED ALTERNATIVES.   All Pharmacy Suggested Alternatives:   prochlorperazine (COMPAZINE) 25 MG suppository  ondansetron (ZOFRAN-ODT) 8 MG TBDP disintegrating tablet  granisetron (KYTRIL) 1 MG tablet

## 2024-05-09 ENCOUNTER — HOSPITAL ENCOUNTER (OUTPATIENT)
Facility: HOSPITAL | Age: 69
End: 2024-05-09
Attending: PSYCHIATRY & NEUROLOGY
Payer: MEDICARE

## 2024-05-09 ENCOUNTER — HOSPITAL ENCOUNTER (OUTPATIENT)
Facility: HOSPITAL | Age: 69
Discharge: HOME | End: 2024-05-09
Attending: PSYCHIATRY & NEUROLOGY
Payer: MEDICARE

## 2024-05-09 DIAGNOSIS — R41.82 ACUTE ALTERATION IN MENTAL STATUS: ICD-10-CM

## 2024-05-09 DIAGNOSIS — H53.9 TRANSIENT VISION DISTURBANCE OF BOTH EYES: ICD-10-CM

## 2024-05-09 DIAGNOSIS — G44.209 TENSION VASCULAR HEADACHE: ICD-10-CM

## 2024-05-09 DIAGNOSIS — R56.9 CONVULSIONS, UNSPECIFIED CONVULSION TYPE (HCC): ICD-10-CM

## 2024-05-09 DIAGNOSIS — G43.109 MIGRAINE WITH AURA AND WITHOUT STATUS MIGRAINOSUS, NOT INTRACTABLE: ICD-10-CM

## 2024-05-09 DIAGNOSIS — G40.209 SIMPLE PARTIAL ONSET SEIZURES FOLLOWED BY IMPAIRED CONSCIOUSNESS (HCC): ICD-10-CM

## 2024-05-09 PROCEDURE — 95719 EEG PHYS/QHP EA INCR W/O VID: CPT | Performed by: PSYCHIATRY & NEUROLOGY

## 2024-05-09 PROCEDURE — A9579 GAD-BASE MR CONTRAST NOS,1ML: HCPCS | Performed by: PSYCHIATRY & NEUROLOGY

## 2024-05-09 PROCEDURE — 95708 EEG WO VID EA 12-26HR UNMNTR: CPT

## 2024-05-09 PROCEDURE — 70553 MRI BRAIN STEM W/O & W/DYE: CPT

## 2024-05-09 PROCEDURE — 6360000004 HC RX CONTRAST MEDICATION: Performed by: PSYCHIATRY & NEUROLOGY

## 2024-05-09 RX ADMIN — GADOTERIDOL 14 ML: 279.3 INJECTION, SOLUTION INTRAVENOUS at 13:56

## 2024-05-10 NOTE — PROCEDURES
abnormal spells of any type seen.    INTERPRETATION:  This a normal ambulatory prolonged 21-hour EEG recording on patient showing no clear areas of focal slowing, no clear spike or spike-and-wave discharges.  No recorded electrographic or dysrhythmic spells of any type.  Clinical correlation is recommended.        MD ARELI SALINAS/NAT  D:  05/10/2024 12:23:24  T:  05/10/2024 13:32:42  JOB #:  [id number]

## 2024-05-13 ENCOUNTER — CLINICAL DOCUMENTATION (OUTPATIENT)
Age: 69
End: 2024-05-13

## 2024-05-13 NOTE — PROGRESS NOTES
I called patient, let her know the MRI scan was basically okay just mild atrophic and white matter disease changes stable, and the EEG was normal, she said thank you

## 2024-05-29 ENCOUNTER — TELEPHONE (OUTPATIENT)
Age: 69
End: 2024-05-29

## 2024-05-30 ENCOUNTER — TELEPHONE (OUTPATIENT)
Age: 69
End: 2024-05-30

## 2024-05-30 NOTE — TELEPHONE ENCOUNTER
Ubrelvy approval     [facility] - Medicare    [phone]   Authorization already on file for this request. Authorization starting on 04/30/2024 and ending on 12/31/2024.

## 2025-01-03 RX ORDER — TOPIRAMATE 100 MG/1
100 TABLET, FILM COATED ORAL 2 TIMES DAILY
Qty: 180 TABLET | Refills: 4 | Status: SHIPPED | OUTPATIENT
Start: 2025-01-03

## 2025-01-03 RX ORDER — SERTRALINE HYDROCHLORIDE 100 MG/1
100 TABLET, FILM COATED ORAL DAILY
Qty: 90 TABLET | Refills: 3 | Status: SHIPPED | OUTPATIENT
Start: 2025-01-03

## 2025-01-03 NOTE — TELEPHONE ENCOUNTER
Received refill request from [facility] pharmacy     Last office visit: 04/08/2024  Next office visit: 04/10/2025  Last med refill: 10/17/2023

## 2025-01-22 DIAGNOSIS — G44.209 TENSION-TYPE HEADACHE, UNSPECIFIED, NOT INTRACTABLE: ICD-10-CM

## 2025-01-22 DIAGNOSIS — E53.8 DEFICIENCY OF OTHER SPECIFIED B GROUP VITAMINS: ICD-10-CM

## 2025-01-22 RX ORDER — RIZATRIPTAN BENZOATE 10 MG/1
10 TABLET, ORALLY DISINTEGRATING ORAL EVERY 12 HOURS PRN
Qty: 36 TABLET | Refills: 4 | Status: SHIPPED | OUTPATIENT
Start: 2025-01-22

## 2025-02-05 ENCOUNTER — TELEPHONE (OUTPATIENT)
Age: 70
End: 2025-02-05

## 2025-02-05 NOTE — TELEPHONE ENCOUNTER
Received a message from  - they receive a PA request for Rizatriptan.  Unknown how or why it ended up there.     Rx written for 36 per 90 days. [employer] new plan - does allow 12 per 30 so the 90 day supply should be acceptable to them.      Faxed to [phone]     [phone] . Will scan to media today.

## 2025-02-05 NOTE — TELEPHONE ENCOUNTER
Routed Ubrelvy PA request to  for PA renewal.  Previous PA  24.      Insurance is now [employer]   ID  [id number]  Group [id number]    We do not have card on file yet.   
Dr Brown Note: see attestation

## 2025-02-10 ENCOUNTER — TELEPHONE (OUTPATIENT)
Age: 70
End: 2025-02-10

## 2025-02-21 ENCOUNTER — TELEPHONE (OUTPATIENT)
Age: 70
End: 2025-02-21

## 2025-02-27 NOTE — TELEPHONE ENCOUNTER
Duplicate encounter   For:     Rizatriptan 10 mg  qty 180 / 90     Auth valid to 12-31-25 from [employer].      Letter in Media.

## 2025-04-10 ENCOUNTER — OFFICE VISIT (OUTPATIENT)
Age: 70
End: 2025-04-10
Payer: MEDICARE

## 2025-04-10 VITALS
OXYGEN SATURATION: 100 % | TEMPERATURE: 98 F | HEART RATE: 77 BPM | BODY MASS INDEX: 25.77 KG/M2 | SYSTOLIC BLOOD PRESSURE: 138 MMHG | HEIGHT: 67 IN | WEIGHT: 164.2 LBS | RESPIRATION RATE: 19 BRPM | DIASTOLIC BLOOD PRESSURE: 88 MMHG

## 2025-04-10 DIAGNOSIS — I65.23 BILATERAL CAROTID ARTERY STENOSIS: ICD-10-CM

## 2025-04-10 DIAGNOSIS — G43.109 MIGRAINE WITH AURA AND WITHOUT STATUS MIGRAINOSUS, NOT INTRACTABLE: Primary | ICD-10-CM

## 2025-04-10 DIAGNOSIS — G51.8 FACIAL NEURALGIA: ICD-10-CM

## 2025-04-10 DIAGNOSIS — G44.209 TENSION VASCULAR HEADACHE: ICD-10-CM

## 2025-04-10 DIAGNOSIS — H53.9 TRANSIENT VISION DISTURBANCE OF BOTH EYES: ICD-10-CM

## 2025-04-10 PROCEDURE — 1159F MED LIST DOCD IN RCRD: CPT | Performed by: PSYCHIATRY & NEUROLOGY

## 2025-04-10 PROCEDURE — 99213 OFFICE O/P EST LOW 20 MIN: CPT | Performed by: PSYCHIATRY & NEUROLOGY

## 2025-04-10 PROCEDURE — 1160F RVW MEDS BY RX/DR IN RCRD: CPT | Performed by: PSYCHIATRY & NEUROLOGY

## 2025-04-10 PROCEDURE — 1126F AMNT PAIN NOTED NONE PRSNT: CPT | Performed by: PSYCHIATRY & NEUROLOGY

## 2025-04-10 PROCEDURE — 1090F PRES/ABSN URINE INCON ASSESS: CPT | Performed by: PSYCHIATRY & NEUROLOGY

## 2025-04-10 PROCEDURE — 3017F COLORECTAL CA SCREEN DOC REV: CPT | Performed by: PSYCHIATRY & NEUROLOGY

## 2025-04-10 PROCEDURE — G8400 PT W/DXA NO RESULTS DOC: HCPCS | Performed by: PSYCHIATRY & NEUROLOGY

## 2025-04-10 PROCEDURE — G8427 DOCREV CUR MEDS BY ELIG CLIN: HCPCS | Performed by: PSYCHIATRY & NEUROLOGY

## 2025-04-10 PROCEDURE — 1036F TOBACCO NON-USER: CPT | Performed by: PSYCHIATRY & NEUROLOGY

## 2025-04-10 PROCEDURE — G8419 CALC BMI OUT NRM PARAM NOF/U: HCPCS | Performed by: PSYCHIATRY & NEUROLOGY

## 2025-04-10 PROCEDURE — 1123F ACP DISCUSS/DSCN MKR DOCD: CPT | Performed by: PSYCHIATRY & NEUROLOGY

## 2025-04-10 ASSESSMENT — PATIENT HEALTH QUESTIONNAIRE - PHQ9
SUM OF ALL RESPONSES TO PHQ QUESTIONS 1-9: 0
SUM OF ALL RESPONSES TO PHQ QUESTIONS 1-9: 0
1. LITTLE INTEREST OR PLEASURE IN DOING THINGS: NOT AT ALL
2. FEELING DOWN, DEPRESSED OR HOPELESS: NOT AT ALL
SUM OF ALL RESPONSES TO PHQ QUESTIONS 1-9: 0
SUM OF ALL RESPONSES TO PHQ QUESTIONS 1-9: 0

## 2025-04-10 NOTE — PROGRESS NOTES
it continues to worsen we may need to repeat an MRI of the brain.  Patient with recent exacerbation of headaches due to stress of dealing with her , returns to sleep well at times, we gave her Zoloft and that is helped a lot, and she will continue that in the Maxalt which usually will relieve the headaches.  Occasionally she will take a second dose.  For preventative therapy, she is on Topamax 100 mg twice a day and tolerating that well we will continue that.  She could not get Ubrelv or Nurtec approved and does not want to try again.    Patient's facial pain is doing much better with previous treatment, and he no longer needs the medication of Neurontin, and she had a normal MRI of the brain and MRA of the brain and evaluation of that last year.  Migraine seem to be well controlled on Topamax 100 mg twice a day and her Zoloft 100 mg once a day and her as needed Maxalt she will continue those   I reassured her that interactions with the triptan's or Zoloft are rare so she should be safe to continue her current medications which are providing adequate relief of her headaches.  Recommended she see her allergy group and see another physician since her allergist has retired.  Her medications are refilled for the patient today, her condition discussed with the patient in detail  Patient is to continue present medications. Patient was advised to remain mentally and physically active, exercising, take her vitamins and vitamin D and medications as prescribed.  Refills given today for her medications  28-minute spent with the patient, going over her exam, discussing therapeutic options and treatment modalities with her in detail.  Patient to be seen again in 12 months time or earlier if needed, and patient will call if any problems in the interim.    Signed By: Cedric Chisholm MD     April 10, 2025       CC: Joe Churchill MD  FAX: [phone]  0

## 2025-07-04 DIAGNOSIS — G43.109 MIGRAINE WITH AURA AND WITHOUT STATUS MIGRAINOSUS, NOT INTRACTABLE: ICD-10-CM

## 2025-07-07 RX ORDER — UBROGEPANT 50 MG/1
TABLET ORAL
Qty: 16 TABLET | Refills: 11 | Status: ACTIVE | OUTPATIENT
Start: 2025-07-07